# Patient Record
Sex: MALE | Race: WHITE | NOT HISPANIC OR LATINO | Employment: FULL TIME | URBAN - METROPOLITAN AREA
[De-identification: names, ages, dates, MRNs, and addresses within clinical notes are randomized per-mention and may not be internally consistent; named-entity substitution may affect disease eponyms.]

---

## 2017-06-05 ENCOUNTER — OFFICE VISIT (OUTPATIENT)
Dept: FAMILY MEDICINE | Facility: OTHER | Age: 27
End: 2017-06-05
Payer: MEDICAID

## 2017-06-05 VITALS
RESPIRATION RATE: 16 BRPM | HEIGHT: 65 IN | OXYGEN SATURATION: 98 % | HEART RATE: 102 BPM | DIASTOLIC BLOOD PRESSURE: 68 MMHG | WEIGHT: 130.9 LBS | SYSTOLIC BLOOD PRESSURE: 110 MMHG | BODY MASS INDEX: 21.81 KG/M2 | TEMPERATURE: 98.9 F

## 2017-06-05 DIAGNOSIS — J40 BRONCHITIS: Primary | ICD-10-CM

## 2017-06-05 DIAGNOSIS — R05.9 COUGH: ICD-10-CM

## 2017-06-05 PROCEDURE — 99214 OFFICE O/P EST MOD 30 MIN: CPT | Performed by: FAMILY MEDICINE

## 2017-06-05 RX ORDER — DOXYCYCLINE 100 MG/1
100 CAPSULE ORAL 2 TIMES DAILY
Qty: 20 CAPSULE | Refills: 0 | Status: SHIPPED | OUTPATIENT
Start: 2017-06-05 | End: 2017-06-15

## 2017-06-05 RX ORDER — CODEINE PHOSPHATE AND GUAIFENESIN 10; 100 MG/5ML; MG/5ML
1 SOLUTION ORAL EVERY 6 HOURS PRN
Qty: 120 ML | Refills: 0 | Status: SHIPPED | OUTPATIENT
Start: 2017-06-05 | End: 2018-06-21

## 2017-06-05 ASSESSMENT — PAIN SCALES - GENERAL: PAINLEVEL: NO PAIN (0)

## 2017-06-05 NOTE — MR AVS SNAPSHOT
After Visit Summary   6/5/2017    Sudhakar Abdalla    MRN: [id number]           Patient Information     Date Of Birth          1990        Visit Information        Provider Department      6/5/2017 4:20 PM Trinity Ballard MD [facility]        Today's Diagnoses     Bronchitis    -  1    Cough          Care Instructions      Bronchitis, Antibiotic Treatment (Adult)    Bronchitis is an infection of the air passages (bronchial tubes) in your lungs. It often occurs when you have a cold. This illness is contagious during the first few days and is spread through the air by coughing and sneezing, or by direct contact (touching the sick person and then touching your own eyes, nose, or mouth).  Symptoms of bronchitis include cough with mucus (phlegm) and low-grade fever. Bronchitis usually lasts 7 to 14 days. Mild cases can be treated with simple home remedies. More severe infection is treated with an antibiotic.  Home care  Follow these guidelines when caring for yourself at home:    If your symptoms are severe, rest at home for the first 2 to 3 days. When you go back to your usual activities, don't let yourself get too tired.    Do not smoke. Also avoid being exposed to secondhand smoke.    You may use over-the-counter medicines to control fever or pain, unless another medicine was prescribed. (Note: If you have chronic liver or kidney disease or have ever had a stomach ulcer or gastrointestinal bleeding, talk with your healthcare provider before using these medicines. Also talk to your provider if you are taking medicine to prevent blood clots.) Aspirin should never be given to anyone younger than 18 years of age who is ill with a viral infection or fever. It may cause severe liver or brain damage.    Your appetite may be poor, so a light diet is fine. Avoid dehydration by drinking 6 to 8 glasses of fluids per day (such as water, soft drinks, sports drinks, juices, tea, or soup).  Extra fluids will help loosen secretions in the nose and lungs.    Over-the-counter cough, cold, and sore-throat medicines will not shorten the length of the illness, but they may be helpful to reduce symptoms. (Note: Do not use decongestants if you have high blood pressure.)    Finish all antibiotic medicine. Do this even if you are feeling better after only a few days.  Follow-up care  Follow up with your healthcare provider, or as advised. If you had an X-ray or ECG (electrocardiogram), a specialist will review it. You will be notified of any new findings that may affect your care.  Note: If you are age 65 or older, or if you have a chronic lung disease or condition that affects your immune system, or you smoke, talk to your healthcare provider about having pneumococcal vaccinations and a yearly influenza vaccination (flu shot).  When to seek medical advice  Call your healthcare provider right away if any of these occur:    Fever of 100.4 F (38 C) or higher    Coughing up increased amounts of colored sputum    Weakness, drowsiness, headache, facial pain, ear pain, or a stiff neck   Call 911, or get immediate medical care  Contact emergency services right away if any of these occur.    Coughing up blood    Worsening weakness, drowsiness, headache, or stiff neck    Trouble breathing, wheezing, or pain with breathing    2713-1949 [employer]. [address] [place] [address], [place] [id number]. All rights reserved. This information is not intended as a substitute for professional medical care. Always follow your healthcare professional's instructions.                Follow-ups after your visit        Who to contact     If you have questions or need follow up information about today's clinic visit or your schedule please contact [facility] directly at [phone].  Normal or non-critical lab and imaging results will be communicated to you by MyChart, letter or phone within 4 business days after  "the clinic has received the results. If you do not hear from us within 7 days, please contact the clinic through [employer] or phone. If you have a critical or abnormal lab result, we will notify you by phone as soon as possible.  Submit refill requests through [employer] or call your pharmacy and they will forward the refill request to us. Please allow 3 business days for your refill to be completed.          Additional Information About Your Visit        [employer] Information     [employer] gives you secure access to your electronic health record. If you see a primary care provider, you can also send messages to your care team and make appointments. If you have questions, please call your primary care clinic.  If you do not have a primary care provider, please call [phone] and they will assist you.        Care EveryWhere ID     This is your Care EveryWhere ID. This could be used by other organizations to access your [facility] medical records  [id number]        Your Vitals Were     Pulse Temperature Respirations Height Pulse Oximetry BMI (Body Mass Index)    102 98.9  F (37.2  C) (Temporal) 16 5' 5\" (1.651 m) 98% 21.78 kg/m2       Blood Pressure from Last 3 Encounters:   06/05/17 110/68   12/28/16 110/66   06/01/16 106/64    Weight from Last 3 Encounters:   06/05/17 130 lb 14.4 oz (59.4 kg)   06/01/16 128 lb (58.1 kg)              Today, you had the following     No orders found for display         Today's Medication Changes          These changes are accurate as of: 6/5/17  4:34 PM.  If you have any questions, ask your nurse or doctor.               Start taking these medicines.        Dose/Directions    doxycycline Monohydrate 100 MG Caps   Used for:  Bronchitis   Started by:  Trinity Ballard MD        Dose:  100 mg   Take 1 capsule (100 mg) by mouth 2 times daily for 10 days   Quantity:  20 capsule   Refills:  0       guaiFENesin-codeine 100-10 MG/5ML Soln solution   Commonly known as:  ROBITUSSIN AC   Used " for:  Bronchitis, Cough   Started by:  Trinity Ballard MD        Dose:  1 tsp.   Take 5 mLs by mouth every 6 hours as needed for cough   Quantity:  120 mL   Refills:  0            Where to get your medicines      These medications were sent to [facility] - [place] - [address]   [address] [place] [address]     Phone:  [phone]     doxycycline Monohydrate 100 MG Caps         Some of these will need a paper prescription and others can be bought over the counter.  Ask your nurse if you have questions.     Bring a paper prescription for each of these medications     guaiFENesin-codeine 100-10 MG/5ML Soln solution                Primary Care Provider    None Specified       No primary provider on file.        Thank you!     Thank you for choosing [facility]  for your care. Our goal is always to provide you with excellent care. Hearing back from our patients is one way we can continue to improve our services. Please take a few minutes to complete the written survey that you may receive in the mail after your visit with us. Thank you!             Your Updated Medication List - Protect others around you: Learn how to safely use, store and throw away your medicines at www.disposemymeds.org.          This list is accurate as of: 6/5/17  4:34 PM.  Always use your most recent med list.                   Brand Name Dispense Instructions for use    doxycycline Monohydrate 100 MG Caps     20 capsule    Take 1 capsule (100 mg) by mouth 2 times daily for 10 days       EXCEDRIN PO      Take 500 mg by mouth 2 times daily as needed       guaiFENesin-codeine 100-10 MG/5ML Soln solution    ROBITUSSIN AC    120 mL    Take 5 mLs by mouth every 6 hours as needed for cough       NYQUIL PO          permethrin 5 % cream    ELIMITE    60 g    Apply cream from head to toe (execpt the face); leave on for 8-14 hours before washing off with water; may reapply in 1 week if live  mites appear.

## 2017-06-05 NOTE — PROGRESS NOTES
SUBJECTIVE:                                                    Sudhakar Abdalla is a 27 year old male who presents to clinic today for the following health issues:      Acute Illness   Acute illness concerns: cough  Onset: 5 days ago     Fever: no     Chills/Sweats: YES- only at the beginning     Headache (location?): no     Sinus Pressure:YES    Conjunctivitis:  no    Ear Pain: no    Rhinorrhea: no     Congestion: YES    Sore Throat: no     Cough: YES-productive of clear sputum    Wheeze: YES    Decreased Appetite: YES    Nausea: no    Vomiting: YES with coughing     Diarrhea:  YES    Dysuria/Freq.: no     Fatigue/Achiness: YES    Sick/Strep Exposure: no      Therapies Tried and outcome: patient has not tried anything at home.           Problem list and histories reviewed & adjusted, as indicated.  Additional history: as documented    Patient Active Problem List   Diagnosis     History of ADHD     Hx of migraines     Hx of scoliosis     Tobacco use disorder     History reviewed. No pertinent surgical history.    Social History   Substance Use Topics     Smoking status: Current Every Day Smoker     Packs/day: 1.00     Types: Cigarettes     Smokeless tobacco: Current User     Alcohol use 0.0 oz/week     0 Standard drinks or equivalent per week      Comment: rare     Family History   Problem Relation Age of Onset     DIABETES Mother      Coronary Artery Disease Mother      Aneurysm Father      Alzheimer Disease Paternal Grandfather          Current Outpatient Prescriptions   Medication Sig Dispense Refill     Pseudoeph-Doxylamine-DM-APAP (NYQUIL PO)        permethrin (ELIMITE) 5 % cream Apply cream from head to toe (execpt the face); leave on for 8-14 hours before washing off with water; may reapply in 1 week if live mites appear. (Patient not taking: Reported on 6/5/2017) 60 g 1     Aspirin-Acetaminophen-Caffeine (EXCEDRIN PO) Take 500 mg by mouth 2 times daily as needed       No Known Allergies  BP Readings from  "Last 3 Encounters:   06/05/17 110/68   12/28/16 110/66   06/01/16 106/64    Wt Readings from Last 3 Encounters:   06/05/17 130 lb 14.4 oz (59.4 kg)   06/01/16 128 lb (58.1 kg)                  Labs reviewed in EPIC    Reviewed and updated as needed this visit by clinical staff  Problems       Reviewed and updated as needed this visit by Provider  Problems         ROS:  C: NEGATIVE for fever, chills, change in weight  E/M: NEGATIVE for ear, mouth and throat problems  RESP:POSITIVE for cough-productive and wheezing  CV: NEGATIVE for chest pain, palpitations or peripheral edema    OBJECTIVE:                                                    /68  Pulse 102  Temp 98.9  F (37.2  C) (Temporal)  Resp 16  Ht 5' 5\" (1.651 m)  Wt 130 lb 14.4 oz (59.4 kg)  SpO2 98%  BMI 21.78 kg/m2  Body mass index is 21.78 kg/(m^2).   GENERAL: , alert, well nourished, well hydrated, no distress  HENT: ear canals- normal; TMs- normal; Nose- normal; Mouth- no ulcers, no lesions  NECK: no tenderness, no adenopathy, no asymmetry, no masses, no stiffness; thyroid- normal to palpation  RESP: Normal respiratory effort, bronchial breath sounds in lung fields, no wheezes  CV: regular rates and rhythm, normal S1 S2, no S3 or S4 and no murmur, no click or rub -  ABDOMEN: soft, no tenderness, no  hepatosplenomegaly, no masses, normal bowel sounds    Diagnostic test results:  Diagnostic Test Results:  none      ASSESSMENT/PLAN:                                                    (J40) Bronchitis  (primary encounter diagnosis)  Comment: Discussed with patient he doesn't want to try the Zithromax will prefer a different medication  Plan: doxycycline Monohydrate 100 MG CAPS,         guaiFENesin-codeine (ROBITUSSIN AC) 100-10         MG/5ML SOLN solution            (R05) Cough  Comment: Due to bronchitis  Plan: guaiFENesin-codeine (ROBITUSSIN AC) 100-10         MG/5ML SOLN solution          Follow up with Provider - FLOR Carey " MD Elio, MD  [facility]      Patient Instructions     Bronchitis, Antibiotic Treatment (Adult)    Bronchitis is an infection of the air passages (bronchial tubes) in your lungs. It often occurs when you have a cold. This illness is contagious during the first few days and is spread through the air by coughing and sneezing, or by direct contact (touching the sick person and then touching your own eyes, nose, or mouth).  Symptoms of bronchitis include cough with mucus (phlegm) and low-grade fever. Bronchitis usually lasts 7 to 14 days. Mild cases can be treated with simple home remedies. More severe infection is treated with an antibiotic.  Home care  Follow these guidelines when caring for yourself at home:    If your symptoms are severe, rest at home for the first 2 to 3 days. When you go back to your usual activities, don't let yourself get too tired.    Do not smoke. Also avoid being exposed to secondhand smoke.    You may use over-the-counter medicines to control fever or pain, unless another medicine was prescribed. (Note: If you have chronic liver or kidney disease or have ever had a stomach ulcer or gastrointestinal bleeding, talk with your healthcare provider before using these medicines. Also talk to your provider if you are taking medicine to prevent blood clots.) Aspirin should never be given to anyone younger than 18 years of age who is ill with a viral infection or fever. It may cause severe liver or brain damage.    Your appetite may be poor, so a light diet is fine. Avoid dehydration by drinking 6 to 8 glasses of fluids per day (such as water, soft drinks, sports drinks, juices, tea, or soup). Extra fluids will help loosen secretions in the nose and lungs.    Over-the-counter cough, cold, and sore-throat medicines will not shorten the length of the illness, but they may be helpful to reduce symptoms. (Note: Do not use decongestants if you have high blood pressure.)    Finish all  antibiotic medicine. Do this even if you are feeling better after only a few days.  Follow-up care  Follow up with your healthcare provider, or as advised. If you had an X-ray or ECG (electrocardiogram), a specialist will review it. You will be notified of any new findings that may affect your care.  Note: If you are age 65 or older, or if you have a chronic lung disease or condition that affects your immune system, or you smoke, talk to your healthcare provider about having pneumococcal vaccinations and a yearly influenza vaccination (flu shot).  When to seek medical advice  Call your healthcare provider right away if any of these occur:    Fever of 100.4 F (38 C) or higher    Coughing up increased amounts of colored sputum    Weakness, drowsiness, headache, facial pain, ear pain, or a stiff neck   Call 911, or get immediate medical care  Contact emergency services right away if any of these occur.    Coughing up blood    Worsening weakness, drowsiness, headache, or stiff neck    Trouble breathing, wheezing, or pain with breathing    3091-1828 [employer]. [address] [place] [address], [place] [address]. All rights reserved. This information is not intended as a substitute for professional medical care. Always follow your healthcare professional's instructions.

## 2017-06-05 NOTE — NURSING NOTE
"Chief Complaint   Patient presents with     Cough       Initial /68  Pulse 102  Temp 98.9  F (37.2  C) (Temporal)  Resp 16  Ht 5' 5\" (1.651 m)  Wt 130 lb 14.4 oz (59.4 kg)  SpO2 98%  BMI 21.78 kg/m2 Estimated body mass index is 21.78 kg/(m^2) as calculated from the following:    Height as of this encounter: 5' 5\" (1.651 m).    Weight as of this encounter: 130 lb 14.4 oz (59.4 kg).  Medication Reconciliation: complete     Nadine Romeo MA    "

## 2017-06-05 NOTE — PATIENT INSTRUCTIONS
Bronchitis, Antibiotic Treatment (Adult)    Bronchitis is an infection of the air passages (bronchial tubes) in your lungs. It often occurs when you have a cold. This illness is contagious during the first few days and is spread through the air by coughing and sneezing, or by direct contact (touching the sick person and then touching your own eyes, nose, or mouth).  Symptoms of bronchitis include cough with mucus (phlegm) and low-grade fever. Bronchitis usually lasts 7 to 14 days. Mild cases can be treated with simple home remedies. More severe infection is treated with an antibiotic.  Home care  Follow these guidelines when caring for yourself at home:    If your symptoms are severe, rest at home for the first 2 to 3 days. When you go back to your usual activities, don't let yourself get too tired.    Do not smoke. Also avoid being exposed to secondhand smoke.    You may use over-the-counter medicines to control fever or pain, unless another medicine was prescribed. (Note: If you have chronic liver or kidney disease or have ever had a stomach ulcer or gastrointestinal bleeding, talk with your healthcare provider before using these medicines. Also talk to your provider if you are taking medicine to prevent blood clots.) Aspirin should never be given to anyone younger than 18 years of age who is ill with a viral infection or fever. It may cause severe liver or brain damage.    Your appetite may be poor, so a light diet is fine. Avoid dehydration by drinking 6 to 8 glasses of fluids per day (such as water, soft drinks, sports drinks, juices, tea, or soup). Extra fluids will help loosen secretions in the nose and lungs.    Over-the-counter cough, cold, and sore-throat medicines will not shorten the length of the illness, but they may be helpful to reduce symptoms. (Note: Do not use decongestants if you have high blood pressure.)    Finish all antibiotic medicine. Do this even if you are feeling better after only a  few days.  Follow-up care  Follow up with your healthcare provider, or as advised. If you had an X-ray or ECG (electrocardiogram), a specialist will review it. You will be notified of any new findings that may affect your care.  Note: If you are age 65 or older, or if you have a chronic lung disease or condition that affects your immune system, or you smoke, talk to your healthcare provider about having pneumococcal vaccinations and a yearly influenza vaccination (flu shot).  When to seek medical advice  Call your healthcare provider right away if any of these occur:    Fever of 100.4 F (38 C) or higher    Coughing up increased amounts of colored sputum    Weakness, drowsiness, headache, facial pain, ear pain, or a stiff neck   Call 911, or get immediate medical care  Contact emergency services right away if any of these occur.    Coughing up blood    Worsening weakness, drowsiness, headache, or stiff neck    Trouble breathing, wheezing, or pain with breathing    6216-7482 [employer]. [address] [place] [address], [place] [address]. All rights reserved. This information is not intended as a substitute for professional medical care. Always follow your healthcare professional's instructions.

## 2018-06-18 NOTE — PROGRESS NOTES
"  SUBJECTIVE:   Sudhakar Abdalla is a 28 year old male who presents to clinic today for the following health issues:    HPI  Abnormal Mood Symptoms  Onset: years ago    Description:   Depression: no   Anxiety: YES    Accompanying Signs & Symptoms:  Still participating in activities that you used to enjoy: yes laly  Fatigue: YES  Irritability: YES  Difficulty concentrating: YES- depends on how much sleep  Changes in appetite: YES  Problems with sleep: YES  Heart racing/beating fast : YES  Thoughts of hurting yourself or others: none    History:   Recent stress: YES- make tacos for a living, promoted to shift lead at work   Prior depression hospitalization: None  Family history of depression: YES- mom, dad had anger issues  Family history of anxiety: YES- possibly    Precipitating factors:   Alcohol/drug use: YES- cannabis    Alleviating factors:  Smoking cannabis    Therapies Tried and outcome: none  Problem list and histories reviewed & adjusted, as indicated.    Will have issues where he's having conversations with anyone and they say the wrong the thing, and he'll feel suddenly very angry. Having screaming matches with people.   His dad was the same way and he is worried he will \"end up like him\". Note: father  of a brain aneurysm at age 38 and patient gets migraines, so he's worried about that too.    Feels like he cannot control his anger. Has good days and bad days, where he'll wake up angry.   Has noticed if he runs out of the marijuana, might be more easily angered (wondered if it's because he knows he doesn't have it).   Does not drink alcohol to make himself feel better.     CARIDAD-7 SCORE 2018   Total Score 3 (minimal anxiety)   Total Score 3       PHQ-9 SCORE 2018   Total Score MyChart 9 (Mild depression)   Total Score 9     Says as a kid, was diagnosed with ADD, ADHD, ODD, possible Asperger's, and maybe more, per him.    Additional history: as documented      Patient Active Problem List "   Diagnosis     History of ADHD     Hx of migraines     Hx of scoliosis     Tobacco use disorder     Chronic bilateral low back pain with bilateral sciatica     Anger     Premature ejaculation     History reviewed. No pertinent surgical history.    Social History   Substance Use Topics     Smoking status: Current Every Day Smoker     Packs/day: 1.50     Types: Cigarettes     Smokeless tobacco: Current User     Alcohol use 0.0 oz/week     0 Standard drinks or equivalent per week      Comment: rare     Family History   Problem Relation Age of Onset     Diabetes Mother      Coronary Artery Disease Mother      Aneurysm Father      Alzheimer Disease Paternal Grandfather          Current Outpatient Prescriptions   Medication Sig Dispense Refill     Aspirin-Acetaminophen-Caffeine (EXCEDRIN PO) Take 500 mg by mouth 2 times daily as needed       nicotine (NICOTROL) 10 MG Inhaler Inhale 6-16 Cartridges into the lungs daily as needed for smoking cessation 180 each 1     Pseudoeph-Doxylamine-DM-APAP (NYQUIL PO)        sertraline (ZOLOFT) 50 MG tablet Take 1 tablet (50 mg) by mouth daily 30 tablet 1     BP Readings from Last 3 Encounters:   06/21/18 110/68   06/05/17 110/68   12/28/16 110/66    Wt Readings from Last 3 Encounters:   06/21/18 120 lb 9.6 oz (54.7 kg)   06/05/17 130 lb 14.4 oz (59.4 kg)   06/01/16 128 lb (58.1 kg)           ROS:  Constitutional, HEENT, cardiovascular, pulmonary, gi and gu systems are negative, except as otherwise noted.    Shortness of breath and some chronic cough with the smoking. Also some wheezing. Would be interested in albuterol nebulizer to help open up his lungs. Says in the past inhaler made breathing worse.     Premature ejaculation issues. Used to last 45 minutes, now ejaculating before penetration sometimes. Ejaculating quickly every time.     Decreased appetite. Has gone 3 days without eating. Then when he eats, will eat a lot of food all at once and he is losing weight.      Wondering  "if he can have a referral to chiropractor that would take his insurance, for his scoliosis. Would also consider referral to physical therapy, hasn't done physical therapy in many years. Had surgery at age of 8 yo. Pain is mostly low back. He cracks his back which helps. Feels like the pain is mostly muscle tension around it. Pain does not radiate. Says he has a little leg weakness. Has tried an inversion table for the back and it felt really good but only lasted short while. Pain is pretty constant. Cannibis makes it better.   He also has episodes of side or mid-abdominal pain when active that are severe and last a few minutes.    Wants to quit smoking. Has been smoking 1.5 + ppd, which is an increase for him. He finds this calms him down. Does not want Chantix. Tried patches once and had a skin reaction (when he was 18).       OBJECTIVE:     /68  Pulse 64  Temp 98.6  F (37  C)  Resp 16  Ht 5' 4.49\" (1.638 m)  Wt 120 lb 9.6 oz (54.7 kg)  BMI 20.39 kg/m2  Body mass index is 20.39 kg/(m^2).  GENERAL: healthy, alert and no distress  NECK: no adenopathy, no asymmetry, masses, or scars and thyroid normal to palpation  RESP: lungs clear to auscultation - no rales, rhonchi or wheezes  CV: regular rate and rhythm, normal S1 S2, no S3 or S4, no murmur, click or rub, no peripheral edema and peripheral pulses strong  ABDOMEN: soft, nontender, no hepatosplenomegaly, no masses and bowel sounds normal  MS: no gross musculoskeletal defects noted, no edema    Diagnostic Test Results:  Results for orders placed or performed in visit on 06/01/16   Lipid Profile with reflex to direct LDL   Result Value Ref Range    Cholesterol 174 <200 mg/dL    Triglycerides 79 <150 mg/dL    HDL Cholesterol 36 (L) >39 mg/dL    LDL Cholesterol Calculated 122 (H) <100 mg/dL    Non HDL Cholesterol 138 (H) <130 mg/dL   Glucose   Result Value Ref Range    Glucose 84 70 - 99 mg/dL   TSH with free T4 reflex   Result Value Ref Range    TSH 1.64 " 0.40 - 4.00 mU/L   Hemoglobin   Result Value Ref Range    Hemoglobin 14.8 13.3 - 17.7 g/dL   WBC Differential   Result Value Ref Range    Diff Method Automated Method     % Neutrophils 39.3 %    % Lymphocytes 50.8 %    % Monocytes 7.8 %    % Eosinophils 1.8 %    % Basophils 0.3 %    Absolute Neutrophil 3.2 1.6 - 8.3 10e9/L    Absolute Lymphocytes 4.1 0.8 - 5.3 10e9/L    Absolute Monocytes 0.6 0.0 - 1.3 10e9/L    Absolute Eosinophils 0.1 0.0 - 0.7 10e9/L    Absolute Basophils 0.0 0.0 - 0.2 10e9/L       ASSESSMENT/PLAN:     Tobacco Cessation:   reports that he has been smoking Cigarettes.  He has been smoking about 1.50 packs per day. He uses smokeless tobacco.  Tobacco Cessation Action Plan: Pharmacotherapies : other Nicotine replacement  Self help information given to patient        ICD-10-CM    1. Anger R45.4 sertraline (ZOLOFT) 50 MG tablet   2. Shortness of breath R06.02    3. Tobacco use disorder F17.200 TOBACCO CESSATION - FOR HEALTH MAINTENANCE     nicotine (NICOTROL) 10 MG Inhaler   4. Chronic bilateral low back pain with bilateral sciatica M54.42 [facility], AND CHIROPRACTIC REFERRAL    M54.41     G89.29    5. Hx of scoliosis Z87.39 [facility], HAND, AND CHIROPRACTIC REFERRAL   6. Premature ejaculation F52.4 sertraline (ZOLOFT) 50 MG tablet   7. Weight loss R63.4 CBC with platelets     Comprehensive metabolic panel     TSH with free T4 reflex   8. Decreased appetite R63.0 CBC with platelets     Comprehensive metabolic panel     TSH with free T4 reflex   9. Screening for HIV without presence of risk factors Z11.4 HIV Antigen Antibody Combo     1. Patient reports long history of anger issues. He currently responds to his anger outbursts with tobacco and marijuana. Will start patient on sertraline 50 mg and follow up in 1 month.   2, 3. Patient has increased his tobacco use with recent increase in back pain and mood concerns. He states he has more shortness of breath, especially with exertion, and would like to  quit. Order sent for nicotine inhaler and information for Quit Plan given. Lungs sounded clear bilaterally, so no need for albuterol at the moment since he's planning on reducing his smoking/quitting. If breathing issues continue, consider spirometry.   4, 5. History of scoliosis s/p surgery. Now reporting worsening low back pain, bilateral, sometimes with pain radiating into legs. Strength normal on exam. Will send referral for chiropractor. Could consider physical therapy as well.   6. Patient reporting new onset premature ejaculation, sometimes before penetration. Starting sertraline for mood, so hopeful this will help delay ejaculation as well. follow up in 1 month.   7, 8. He reports decreased appetite, sometimes going up to 3 days without desire to eat. When he does eat, eats large quantities. He has lost weight. Will check CBC, TSH, CMP. Hopeful controlling mood and/or pain will help appetite as well.   9. Patient agreeable to HIV screening per CDC guidelines.             Patient Instructions   Thank you for visiting [facility]    Let's try sertraline to help with your mood/anger.      Let me know if issues.    OK to try nicotine replacement to help you quit smoking and use of marijuana.    Let us know if you don't hear from chiropractic soon.    Contact us or return if questions or concerns.     We'll let you know your lab results as soon as we can.     Please Follow Up when indicated on the section below this one on your After-Visit Summary.      If you had imaging scheduled please refer to your radiology prep sheet.    Appointment    Date_______________     Time_____________    Day:   M TU W TH F    With____________________________    Location_________________________    If you need medication refills, please contact your pharmacy 3 days before your prescriptions runs out. If you are out of refills, your pharmacy will contact contact the clinic.    Contact us or return if questions  or concerns.     -Your Care Team:  MD Kaitlin Hsu PA-C Joel De Haan, PA-C Elizabeth McLean, APRN CNP    General information about your clinic      Clinic hours:     Lab hours:  Phone [phone]  Monday 7:30 am-7 pm    Monday 8:30 am-6:30 pm  Tuesday-Friday 7:30 am-5 pm   Tuesday-Friday 8:30 am-4:30 pm    Pharmacy hours:  Phone [phone]  Monday 8:30 am-7pm  Tuesday-Friday 8:30am-6 pm                                       Mychart assistance [phone]        We would like to hear from you, how was your visit today?    Madisyn Powers  Patient Information Supervisor   Patient Care Supervisor  [facility]  [phone]       This document serves as a record of the services and decisions personally performed and made by Delbert Wade MD.  It was created on his/her behalf by Anai Alvarenga, a trained medical student and scribe.  The creation of this record is based on the provider's personal observations and the statements of the patient.     Anai Alvarenga, MS4  June 21, 2018    This patient was seen and examined by myself as well as the medical student.  The medical student has scribed the note and I have reviewed it, edited it appropriately and agree with the final documentation. Electronically signed by MD Delbert Hsu MD, MD  [facility]

## 2018-06-21 ENCOUNTER — OFFICE VISIT (OUTPATIENT)
Dept: FAMILY MEDICINE | Facility: OTHER | Age: 28
End: 2018-06-21
Payer: MEDICAID

## 2018-06-21 VITALS
BODY MASS INDEX: 20.59 KG/M2 | RESPIRATION RATE: 16 BRPM | WEIGHT: 120.6 LBS | SYSTOLIC BLOOD PRESSURE: 110 MMHG | TEMPERATURE: 98.6 F | HEART RATE: 64 BPM | DIASTOLIC BLOOD PRESSURE: 68 MMHG | HEIGHT: 64 IN

## 2018-06-21 DIAGNOSIS — R63.0 DECREASED APPETITE: ICD-10-CM

## 2018-06-21 DIAGNOSIS — R63.4 WEIGHT LOSS: ICD-10-CM

## 2018-06-21 DIAGNOSIS — F17.200 TOBACCO USE DISORDER: ICD-10-CM

## 2018-06-21 DIAGNOSIS — Z87.39 HX OF SCOLIOSIS: ICD-10-CM

## 2018-06-21 DIAGNOSIS — R45.4 ANGER: Primary | ICD-10-CM

## 2018-06-21 DIAGNOSIS — M54.42 CHRONIC BILATERAL LOW BACK PAIN WITH BILATERAL SCIATICA: ICD-10-CM

## 2018-06-21 DIAGNOSIS — R06.02 SHORTNESS OF BREATH: ICD-10-CM

## 2018-06-21 DIAGNOSIS — F52.4 PREMATURE EJACULATION: ICD-10-CM

## 2018-06-21 DIAGNOSIS — Z11.4 SCREENING FOR HIV WITHOUT PRESENCE OF RISK FACTORS: ICD-10-CM

## 2018-06-21 DIAGNOSIS — M54.41 CHRONIC BILATERAL LOW BACK PAIN WITH BILATERAL SCIATICA: ICD-10-CM

## 2018-06-21 DIAGNOSIS — G89.29 CHRONIC BILATERAL LOW BACK PAIN WITH BILATERAL SCIATICA: ICD-10-CM

## 2018-06-21 LAB
ALBUMIN SERPL-MCNC: 4.5 G/DL (ref 3.4–5)
ALP SERPL-CCNC: 88 U/L (ref 40–150)
ALT SERPL W P-5'-P-CCNC: 23 U/L (ref 0–70)
ANION GAP SERPL CALCULATED.3IONS-SCNC: 9 MMOL/L (ref 3–14)
AST SERPL W P-5'-P-CCNC: 12 U/L (ref 0–45)
BILIRUB SERPL-MCNC: 0.6 MG/DL (ref 0.2–1.3)
BUN SERPL-MCNC: 11 MG/DL (ref 7–30)
CALCIUM SERPL-MCNC: 9.2 MG/DL (ref 8.5–10.1)
CHLORIDE SERPL-SCNC: 104 MMOL/L (ref 94–109)
CO2 SERPL-SCNC: 27 MMOL/L (ref 20–32)
CREAT SERPL-MCNC: 0.86 MG/DL (ref 0.66–1.25)
ERYTHROCYTE [DISTWIDTH] IN BLOOD BY AUTOMATED COUNT: 13.8 % (ref 10–15)
GFR SERPL CREATININE-BSD FRML MDRD: >90 ML/MIN/1.7M2
GLUCOSE SERPL-MCNC: 93 MG/DL (ref 70–99)
HCT VFR BLD AUTO: 47.6 % (ref 40–53)
HGB BLD-MCNC: 16.2 G/DL (ref 13.3–17.7)
MCH RBC QN AUTO: 30.5 PG (ref 26.5–33)
MCHC RBC AUTO-ENTMCNC: 34 G/DL (ref 31.5–36.5)
MCV RBC AUTO: 90 FL (ref 78–100)
PLATELET # BLD AUTO: 212 10E9/L (ref 150–450)
POTASSIUM SERPL-SCNC: 5 MMOL/L (ref 3.4–5.3)
PROT SERPL-MCNC: 7.9 G/DL (ref 6.8–8.8)
RBC # BLD AUTO: 5.32 10E12/L (ref 4.4–5.9)
SODIUM SERPL-SCNC: 140 MMOL/L (ref 133–144)
TSH SERPL DL<=0.005 MIU/L-ACNC: 0.94 MU/L (ref 0.4–4)
WBC # BLD AUTO: 7.7 10E9/L (ref 4–11)

## 2018-06-21 PROCEDURE — 36415 COLL VENOUS BLD VENIPUNCTURE: CPT | Performed by: FAMILY MEDICINE

## 2018-06-21 PROCEDURE — 80053 COMPREHEN METABOLIC PANEL: CPT | Performed by: FAMILY MEDICINE

## 2018-06-21 PROCEDURE — 99214 OFFICE O/P EST MOD 30 MIN: CPT | Performed by: FAMILY MEDICINE

## 2018-06-21 PROCEDURE — 87389 HIV-1 AG W/HIV-1&-2 AB AG IA: CPT | Performed by: FAMILY MEDICINE

## 2018-06-21 PROCEDURE — 85027 COMPLETE CBC AUTOMATED: CPT | Performed by: FAMILY MEDICINE

## 2018-06-21 PROCEDURE — 84443 ASSAY THYROID STIM HORMONE: CPT | Performed by: FAMILY MEDICINE

## 2018-06-21 ASSESSMENT — PATIENT HEALTH QUESTIONNAIRE - PHQ9
SUM OF ALL RESPONSES TO PHQ QUESTIONS 1-9: 9
10. IF YOU CHECKED OFF ANY PROBLEMS, HOW DIFFICULT HAVE THESE PROBLEMS MADE IT FOR YOU TO DO YOUR WORK, TAKE CARE OF THINGS AT HOME, OR GET ALONG WITH OTHER PEOPLE: SOMEWHAT DIFFICULT
SUM OF ALL RESPONSES TO PHQ QUESTIONS 1-9: 9

## 2018-06-21 ASSESSMENT — ANXIETY QUESTIONNAIRES
1. FEELING NERVOUS, ANXIOUS, OR ON EDGE: SEVERAL DAYS
7. FEELING AFRAID AS IF SOMETHING AWFUL MIGHT HAPPEN: NOT AT ALL
5. BEING SO RESTLESS THAT IT IS HARD TO SIT STILL: NOT AT ALL
7. FEELING AFRAID AS IF SOMETHING AWFUL MIGHT HAPPEN: NOT AT ALL
2. NOT BEING ABLE TO STOP OR CONTROL WORRYING: NOT AT ALL
4. TROUBLE RELAXING: SEVERAL DAYS
GAD7 TOTAL SCORE: 3
GAD7 TOTAL SCORE: 3
3. WORRYING TOO MUCH ABOUT DIFFERENT THINGS: NOT AT ALL
6. BECOMING EASILY ANNOYED OR IRRITABLE: SEVERAL DAYS
GAD7 TOTAL SCORE: 3

## 2018-06-21 ASSESSMENT — PAIN SCALES - GENERAL: PAINLEVEL: MILD PAIN (3)

## 2018-06-21 NOTE — MR AVS SNAPSHOT
After Visit Summary   6/21/2018    Sudhakar Abdalla    MRN: [id number]           Patient Information     Date Of Birth          1990        Visit Information        Provider Department      6/21/2018 1:45 PM Delbert Wade MD [facility]        Today's Diagnoses     Tobacco use disorder    -  1    Anger        Premature ejaculation        Shortness of breath        Hx of scoliosis        Chronic bilateral low back pain with bilateral sciatica        Screening for HIV without presence of risk factors        Weight loss        Decreased appetite          Care Instructions    Thank you for visiting [facility]    Let's try sertraline to help with your mood/anger.      Let me know if issues.    OK to try nicotine replacement to help you quit smoking and use of marijuana.    Let us know if you don't hear from chiropractic soon.    Contact us or return if questions or concerns.     We'll let you know your lab results as soon as we can.     Please Follow Up when indicated on the section below this one on your After-Visit Summary.      If you had imaging scheduled please refer to your radiology prep sheet.    Appointment    Date_______________     Time_____________    Day:   M TU W TH F    With____________________________    Location_________________________    If you need medication refills, please contact your pharmacy 3 days before your prescriptions runs out. If you are out of refills, your pharmacy will contact contact the clinic.    Contact us or return if questions or concerns.     -Your Care Team:  MD Kaitlin Hsu PA-C Joel De Haan, PA-C Elizabeth McLean, ROSA SNOW    General information about your clinic      Clinic hours:     Lab hours:  Phone [phone]  Monday 7:30 am-7 pm    Monday 8:30 am-6:30 pm  Tuesday-Friday 7:30 am-5 pm   Tuesday-Friday 8:30 am-4:30 pm    Pharmacy hours:  Phone [phone]  Monday 8:30  am-7pm  Tuesday-Friday 8:30am-6 pm                                       Katelynt assistance [phone]        We would like to hear from you, how was your visit today?    Madisyn Powers  Patient Information Supervisor   Patient Care Supervisor  [facility] [place], and [facility] [place], and [facility]  [phone]             Follow-ups after your visit        Additional Services     [facility], HAND, AND CHIROPRACTIC REFERRAL       **This order will print in the [facility] Scheduling Office**    Physical Therapy, Hand Therapy and Chiropractic Care are available through:    *[facility]  *[facility]  *[facility]    Call one number to schedule at any of the above locations: [phone].    Your provider has referred you to: Chiropractic at [facility] or [facility]    Indication/Reason for Referral: Low Back Pain  Onset of Illness: childhood  Therapy Orders: Evaluate and Treat  Special Programs: if indicated  Special Request: None    Tanvi Tinajero      Additional Comments for the Therapist or Chiropractor: Pt with history of scoliosis and surgery for it.    Please be aware that coverage of these services is subject to the terms and limitations of your health insurance plan.  Call member services at your health plan with any benefit or coverage questions.      Please bring the following to your appointment:    *Your personal calendar for scheduling future appointments  *Comfortable clothing                  Follow-up notes from your care team     Return in about 4 weeks (around 7/19/2018) for Routine Visit, Evisit or phone visit.      Who to contact     If you have questions or need follow up information about today's clinic visit or your schedule please contact [facility] directly at [phone].  Normal or non-critical lab and imaging results will be communicated to you by MyChart, letter or phone  "within 4 business days after the clinic has received the results. If you do not hear from us within 7 days, please contact the clinic through [email or web address] or phone. If you have a critical or abnormal lab result, we will notify you by phone as soon as possible.  Submit refill requests through [email or web address] or call your pharmacy and they will forward the refill request to us. Please allow 3 business days for your refill to be completed.          Additional Information About Your Visit        [employer] Information     [email or web address] gives you secure access to your electronic health record. If you see a primary care provider, you can also send messages to your care team and make appointments. If you have questions, please call your primary care clinic.  If you do not have a primary care provider, please call [phone] and they will assist you.        Care EveryWhere ID     This is your Care EveryWhere ID. This could be used by other organizations to access your [place] medical records  [id number]        Your Vitals Were     Pulse Temperature Respirations Height BMI (Body Mass Index)       64 98.6  F (37  C) 16 5' 4.49\" (1.638 m) 20.39 kg/m2        Blood Pressure from Last 3 Encounters:   06/21/18 110/68   06/05/17 110/68   12/28/16 110/66    Weight from Last 3 Encounters:   06/21/18 120 lb 9.6 oz (54.7 kg)   06/05/17 130 lb 14.4 oz (59.4 kg)   06/01/16 128 lb (58.1 kg)              We Performed the Following     CBC with platelets     Comprehensive metabolic panel     HIV Antigen Antibody Combo     [facility] PT, HAND, AND CHIROPRACTIC REFERRAL     TOBACCO CESSATION - FOR HEALTH MAINTENANCE     TSH with free T4 reflex          Today's Medication Changes          These changes are accurate as of 6/21/18  3:04 PM.  If you have any questions, ask your nurse or doctor.               Start taking these medicines.        Dose/Directions    nicotine 10 MG Inhaler   Commonly known as:  NICOTROL   Used for:  Tobacco use disorder   Started by:  " Delbert Wade MD        Dose:  6-16 Cartridge   Inhale 6-16 Cartridges into the lungs daily as needed for smoking cessation   Quantity:  180 each   Refills:  1       sertraline 50 MG tablet   Commonly known as:  ZOLOFT   Used for:  Anger, Premature ejaculation   Started by:  Delbert Wade MD        Dose:  50 mg   Take 1 tablet (50 mg) by mouth daily   Quantity:  30 tablet   Refills:  1            Where to get your medicines      These medications were sent to [facility] [place] - [address]   [address] [place] [address]     Phone:  [phone]     nicotine 10 MG Inhaler    sertraline 50 MG tablet                Primary Care Provider Office Phone # Fax #    Delbert Wade [phone] [address] [place] [address]        Equal Access to Services     [facility]: Hadii aad ku hadasho Soomaali, waaxda luqadaha, qaybta kaalmada adeegyada, waxay yuliin hayaan adeelicia merida . So [facility] [phone].    ATENCIÓN: Si habla español, tiene a zamora disposición servicios gratuitos de asistencia lingüística. [facility] [phone].    We comply with applicable federal civil rights laws and [place] laws. We do not discriminate on the basis of race, color, national origin, age, disability, sex, sexual orientation, or gender identity.            Thank you!     Thank you for choosing [facility]  for your care. Our goal is always to provide you with excellent care. Hearing back from our patients is one way we can continue to improve our services. Please take a few minutes to complete the written survey that you may receive in the mail after your visit with us. Thank you!             Your Updated Medication List - Protect others around you: Learn how to safely use, store and throw away your medicines at www.disposemymeds.org.          This list is accurate as of 6/21/18  3:04 PM.  Always use your most recent med  list.                   Brand Name Dispense Instructions for use Diagnosis    EXCEDRIN PO      Take 500 mg by mouth 2 times daily as needed        nicotine 10 MG Inhaler    NICOTROL    180 each    Inhale 6-16 Cartridges into the lungs daily as needed for smoking cessation    Tobacco use disorder       NYQUIL PO           sertraline 50 MG tablet    ZOLOFT    30 tablet    Take 1 tablet (50 mg) by mouth daily    Anger, Premature ejaculation

## 2018-06-21 NOTE — PATIENT INSTRUCTIONS
Thank you for visiting [facility]    Let's try sertraline to help with your mood/anger.      Let me know if issues.    OK to try nicotine replacement to help you quit smoking and use of marijuana.    Let us know if you don't hear from chiropractic soon.    Contact us or return if questions or concerns.     We'll let you know your lab results as soon as we can.     Please Follow Up when indicated on the section below this one on your After-Visit Summary.      If you had imaging scheduled please refer to your radiology prep sheet.    Appointment    Date_______________     Time_____________    Day:   M TU W TH F    With____________________________    Location_________________________    If you need medication refills, please contact your pharmacy 3 days before your prescriptions runs out. If you are out of refills, your pharmacy will contact contact the clinic.    Contact us or return if questions or concerns.     -Your Care Team:  MD Kaitlin Hsu PA-C Joel De Haan, PA-C Elizabeth McLean, APRN CNP    General information about your clinic      Clinic hours:     Lab hours:  Phone [phone]  Monday 7:30 am-7 pm    Monday 8:30 am-6:30 pm  Tuesday-Friday 7:30 am-5 pm   Tuesday-Friday 8:30 am-4:30 pm    Pharmacy hours:  Phone [phone]  Monday 8:30 am-7pm  Tuesday-Friday 8:30am-6 pm                                       Mychart assistance [phone]        We would like to hear from you, how was your visit today?    Madisyn Powers  Patient Information Supervisor   Patient Care Supervisor  [facility] [place], and [facility], and [facility]  [phone]

## 2018-06-22 LAB — HIV 1+2 AB+HIV1 P24 AG SERPL QL IA: NONREACTIVE

## 2018-06-22 ASSESSMENT — PATIENT HEALTH QUESTIONNAIRE - PHQ9: SUM OF ALL RESPONSES TO PHQ QUESTIONS 1-9: 9

## 2018-06-22 ASSESSMENT — ANXIETY QUESTIONNAIRES: GAD7 TOTAL SCORE: 3

## 2018-08-11 ENCOUNTER — HOSPITAL ENCOUNTER (EMERGENCY)
Facility: CLINIC | Age: 28
Discharge: HOME | End: 2018-08-12
Attending: FAMILY MEDICINE | Admitting: FAMILY MEDICINE
Payer: MEDICAID

## 2018-08-11 DIAGNOSIS — T23.211A PARTIAL THICKNESS BURN OF RIGHT THUMB, INITIAL ENCOUNTER: ICD-10-CM

## 2018-08-11 PROCEDURE — 99283 EMERGENCY DEPT VISIT LOW MDM: CPT | Performed by: FAMILY MEDICINE

## 2018-08-11 PROCEDURE — 99283 EMERGENCY DEPT VISIT LOW MDM: CPT | Mod: Z6 | Performed by: FAMILY MEDICINE

## 2018-08-11 NOTE — ED AVS SNAPSHOT
[facility] Emergency Department    [address] [place] [address]    Phone:  [phone]    Fax:  [phone]                                       Sudhakar Abdalla   MRN: [id number]    Department:  [facility] Emergency Department   Date of Visit:  8/11/2018           After Visit Summary Signature Page     I have received my discharge instructions, and my questions have been answered. I have discussed any challenges I see with this plan with the nurse or doctor.    ..........................................................................................................................................  Patient/Patient Representative Signature      ..........................................................................................................................................  Patient Representative Print Name and Relationship to Patient    ..................................................               ................................................  Date                                            Time    ..........................................................................................................................................  Reviewed by Signature/Title    ...................................................              ..............................................  Date                                                            Time

## 2018-08-11 NOTE — ED AVS SNAPSHOT
[facility] Emergency Department    [address] [place] [address]    Phone:  [phone]    Fax:  [phone]                                       Sudhakar Abdalla   MRN: [id number]    Department:  [facility] Emergency Department   Date of Visit:  8/11/2018           Patient Information     Date Of Birth          1990        Your diagnoses for this visit were:     Partial thickness burn of right thumb, initial encounter        You were seen by Ernie Echols MD.      Follow-up Information     Follow up with Delbert Wade MD In 1 week.    Specialty:  Family Practice    Contact information:    [address] [place] [id number] [phone]          Discharge Instructions         Keep the wound covered.  Bacitracin to be applied with dressing changes twice a day.  Watch for signs of infection.  Tylenol/ibuprofen as needed for discomfort.  Recheck in clinic in 1 week for reevaluation and eventual MMI determination.  Take the copy of your Report of Workability Form to your employer.  Your copy is below.  Thank you for choosing [facility]. We appreciate the opportunity to meet your urgent medical needs. Please let us know if we could have done anything to make your stay more satisfying.    After discharge, please closely monitor for any new or worsening symptoms. Return to the Emergency Department if you develop any acute worsening signs or symptoms.    If you had lab work, cultures or imaging studies done during your stay, the final results may still be pending. We will call you if your plan of care needs to change. However, if you are not improving as expected, please follow up with your primary care provider or clinic.     Start any prescription medications that were prescribed to you and take them as directed.     Please see additional handouts that may be pertinent to your condition.  Second-Degree Burn  A burn occurs when skin is exposed to  too much heat, sun, or harsh chemicals. A second-degree burn (partial-thickness burn) is deeper than a first-degree burn (superficial burn). It usually causes a blister to form. The blister may remain intact and gradually go away on its own. Or it may break open. The goal of treatment is to relieve pain and stop infection while the burn heals.  Home care  Use pain medicine as directed. If no pain medicine was prescribed, you may use over-the-counter medicine to control pain. If you have chronic liver or kidney disease, talk with your healthcare provider before using acetaminophen or ibuprofen. Also talk with your provider if you've had a stomach ulcer or GI bleeding.  General care    On the first day, you may put a cool compress on the wound to ease pain. A cool compress is a small towel soaked in cool water.    If you were sent home with the blister intact, don't break the blister. The risk for infection is greater if the blister breaks. If a bandage was applied, change it once a day, unless told otherwise. If the bandage becomes wet or soiled, change it as soon as you can.    Sometimes an infection may occur even with proper treatment. Check the burn daily for the signs of infection listed below.    Eat more calories and protein until your wound is healed.    Wear a hat, sunscreen, and long sleeves while in the sun to protect the skin.    Don't pick or scratch at the wound. Use over-the-counter medicines like diphenhydramine for itching.    Avoid tight-fitting clothes.  To change a bandage:    Wash your hands.    Take off the old bandage. If the bandage sticks, soak it off under warm running water.    Once the bandage is off, gently wash the burn area with mild soap and warm water to remove any cream, ointment, ooze, or scab. You may do this in a sink, under a tub faucet, or in the shower. Rinse off the soap and gently pat dry with a clean towel.    Check for signs of infection listed below.    Put any prescribed  antibiotic cream or ointment on the wound.    Cover the burn with nonstick gauze. Then wrap it with the bandage material.  Follow-up care  Follow up with your healthcare provider, or as advised.  When to seek medical advice  Call your healthcare provider right away if you have any of these signs of infection:    Fever of 100.4 F (38 C) or higher, or as directed by your healthcare provider    Pain that gets worse    Redness or swelling that gets worse    Pus comes from the burn    Red streaks in your skin coming from the burn    Wound doesn't appear to be healing    Nausea or vomiting   Date Last Reviewed: 2017-2017 [employer]. [address] [place] [address]. All rights reserved. This information is not intended as a substitute for professional medical care. Always follow your healthcare professional's instructions.      REPORT OF WORK ABILITY    PATIENT DATA  Employee Name:   SUDHAKAR HALL       : 1990  #: [id number]      Work related injury: yes  Employer at time of injury: [employer]  Employer contact & phone: N/A  Employed elsewhere? no  Workers' Compensation Carrier/Managed Care Plan:  N/A    Today's date: 2018  Date of injury:2018  Date of first visit: 2018    PROVIDER EVALUATION: Please fill in as needed.  Please give copy to employee for employer.  1. Diagnosis: (T23.211A) Partial thickness burn of right thumb, initial encounter  2. Treatment: exam, dressing  3. Medication: bacitracin, Ibuprofen/Tylenol  NOTE: When ordering a medication, [place] Rules require Work Comp or WC on prescriptions.  4. Return to work date: 2018  Restrictions:  Sudhakar Hall is able to return to work with restrictions:    Keep the wound clean, dry, and covered.  Duration of restrictions:  Until rechecked in clinic.      Maximum Medical Improvement (Date): Deferred. To be determined at follow up visit in clinic.  Any Permanent Partial Disability?  Deferred to future exam/consult.    Provider comments: All work comp injuries require a follow up clinic visit for reevaluation and eventual MMI (Maximum Medical Improvement) determination.  It is your responsibility to make your follow up appointment.  Any further work comp forms, disability evaluations, etc, will need to be done through your clinic.        Medical Examiner:  Ernie Aly MD   License or registration: [id number]  [address] [place] [address]  [phone] fax         Next appointment:  1 week.  To be scheduled by patient.     24 Hour Appointment Hotline       To make an appointment at any [facility] clinic, call [phone] ([phone]). If you don't have a family doctor or clinic, we will help you find one. [facility] clinics are conveniently located to serve the needs of you and your family.             Review of your medicines      START taking        Dose / Directions Last dose taken    ibuprofen 200 MG tablet   Commonly known as:  ADVIL/MOTRIN   Dose:  600 mg        Take 3 tablets (600 mg) by mouth every 6 hours as needed for pain or fever   Refills:  0          Our records show that you are taking the medicines listed below. If these are incorrect, please call your family doctor or clinic.        Dose / Directions Last dose taken    EXCEDRIN PO   Dose:  500 mg        Take 500 mg by mouth 2 times daily as needed   Refills:  0        nicotine 10 MG Inhaler   Commonly known as:  NICOTROL   Dose:  6-16 Cartridge   Quantity:  180 each        Inhale 6-16 Cartridges into the lungs daily as needed for smoking cessation   Refills:  1        NYQUIL PO        Refills:  0        sertraline 50 MG tablet   Commonly known as:  ZOLOFT   Dose:  50 mg   Quantity:  30 tablet        Take 1 tablet (50 mg) by mouth daily   Refills:  1                Prescriptions were sent or printed at these locations (1 Prescription)                   [facility] - [place], [place] - [address]   [address] [address]    Telephone:  [phone]   Fax:  [phone]   Hours:                  Not Printed or Sent (1 of 1)         ibuprofen (ADVIL/MOTRIN) 200 MG tablet                Orders Needing Specimen Collection     None      Pending Results     No orders found for last 3 day(s).            Pending Culture Results     No orders found for last 3 day(s).            Pending Results Instructions     If you had any lab results that were not finalized at the time of your Discharge, you can call the ED Lab Result RN at [phone]. You will be contacted by this team for any positive Lab results or changes in treatment. The nurses are available 7 days a week from 10A to 6:30P.  You can leave a message 24 hours per day and they will return your call.        Thank you for choosing [facility]       Thank you for choosing [facility] for your care. Our goal is always to provide you with excellent care. Hearing back from our patients is one way we can continue to improve our services. Please take a few minutes to complete the written survey that you may receive in the mail after you visit with us. Thank you!        [employer] Information     [employer] gives you secure access to your electronic health record. If you see a primary care provider, you can also send messages to your care team and make appointments. If you have questions, please call your primary care clinic.  If you do not have a primary care provider, please call [phone] and they will assist you.        Care EveryWhere ID     This is your Care EveryWhere ID. This could be used by other organizations to access your [facility] medical records  [id number]        Equal Access to Services     JAMIE POLK : José Miguel Winslow, janneth brunson, qaalfonso jay. [facility] [phone].    ATENCIÓN: ananth Morel  zamora disposición servicios gratuitos de asistencia lingüística. Llgabriel al [phone].    We comply with applicable federal civil rights laws and [place] laws. We do not discriminate on the basis of race, color, national origin, age, disability, sex, sexual orientation, or gender identity.            After Visit Summary       This is your record. Keep this with you and show to your community pharmacist(s) and doctor(s) at your next visit.

## 2018-08-11 NOTE — LETTER
REPORT OF WORK ABILITY    PATIENT DATA  Employee Name:   SUDHAKAR HALL       : 1990  #: [id number]      Work related injury: yes  Employer at time of injury: [employer]  Employer contact & phone: N/A  Employed elsewhere? no  Workers' Compensation Carrier/Managed Care Plan:  N/A    Today's date: 2018  Date of injury:2018  Date of first visit: 2018    PROVIDER EVALUATION: Please fill in as needed.  Please give copy to employee for employer.  1. Diagnosis: (T23.211A) Partial thickness burn of right thumb, initial encounter  2. Treatment: exam, dressing  3. Medication: bacitracin, Ibuprofen/Tylenol  NOTE: When ordering a medication, [place] Rules require [place] or WC on prescriptions.  4. Return to work date: 2018  Restrictions:  Sudhakar Hall is able to return to work with restrictions:    Keep the wound clean, dry, and covered.  Duration of restrictions:  Until rechecked in clinic.      Maximum Medical Improvement (Date): Deferred. To be determined at follow up visit in clinic.  Any Permanent Partial Disability? Deferred to future exam/consult.    Provider comments: All work comp injuries require a follow up clinic visit for reevaluation and eventual MMI (Maximum Medical Improvement) determination.  It is your responsibility to make your follow up appointment.  Any further work comp forms, disability evaluations, etc, will need to be done through your clinic.        Medical Examiner:  Ernie Aly MD   License or registration: [id number]  [address] [address]  [phone] fax         Next appointment:  1 week.  To be scheduled by patient.

## 2018-08-12 VITALS
SYSTOLIC BLOOD PRESSURE: 119 MMHG | OXYGEN SATURATION: 99 % | TEMPERATURE: 98.1 F | DIASTOLIC BLOOD PRESSURE: 84 MMHG | RESPIRATION RATE: 20 BRPM

## 2018-08-12 RX ORDER — IBUPROFEN 200 MG
600 TABLET ORAL EVERY 6 HOURS PRN
COMMUNITY
Start: 2018-08-12

## 2018-08-12 NOTE — DISCHARGE INSTRUCTIONS
Keep the wound covered.  Bacitracin to be applied with dressing changes twice a day.  Watch for signs of infection.  Tylenol/ibuprofen as needed for discomfort.  Recheck in clinic in 1 week for reevaluation and eventual MMI determination.  Take the copy of your Report of Workability Form to your employer.  Your copy is below.  Thank you for choosing [facility]. We appreciate the opportunity to meet your urgent medical needs. Please let us know if we could have done anything to make your stay more satisfying.    After discharge, please closely monitor for any new or worsening symptoms. Return to the Emergency Department if you develop any acute worsening signs or symptoms.    If you had lab work, cultures or imaging studies done during your stay, the final results may still be pending. We will call you if your plan of care needs to change. However, if you are not improving as expected, please follow up with your primary care provider or clinic.     Start any prescription medications that were prescribed to you and take them as directed.     Please see additional handouts that may be pertinent to your condition.  Second-Degree Burn  A burn occurs when skin is exposed to too much heat, sun, or harsh chemicals. A second-degree burn (partial-thickness burn) is deeper than a first-degree burn (superficial burn). It usually causes a blister to form. The blister may remain intact and gradually go away on its own. Or it may break open. The goal of treatment is to relieve pain and stop infection while the burn heals.  Home care  Use pain medicine as directed. If no pain medicine was prescribed, you may use over-the-counter medicine to control pain. If you have chronic liver or kidney disease, talk with your healthcare provider before using acetaminophen or ibuprofen. Also talk with your provider if you've had a stomach ulcer or GI bleeding.  General care    On the first day, you may put a cool compress on  the wound to ease pain. A cool compress is a small towel soaked in cool water.    If you were sent home with the blister intact, don't break the blister. The risk for infection is greater if the blister breaks. If a bandage was applied, change it once a day, unless told otherwise. If the bandage becomes wet or soiled, change it as soon as you can.    Sometimes an infection may occur even with proper treatment. Check the burn daily for the signs of infection listed below.    Eat more calories and protein until your wound is healed.    Wear a hat, sunscreen, and long sleeves while in the sun to protect the skin.    Don't pick or scratch at the wound. Use over-the-counter medicines like diphenhydramine for itching.    Avoid tight-fitting clothes.  To change a bandage:    Wash your hands.    Take off the old bandage. If the bandage sticks, soak it off under warm running water.    Once the bandage is off, gently wash the burn area with mild soap and warm water to remove any cream, ointment, ooze, or scab. You may do this in a sink, under a tub faucet, or in the shower. Rinse off the soap and gently pat dry with a clean towel.    Check for signs of infection listed below.    Put any prescribed antibiotic cream or ointment on the wound.    Cover the burn with nonstick gauze. Then wrap it with the bandage material.  Follow-up care  Follow up with your healthcare provider, or as advised.  When to seek medical advice  Call your healthcare provider right away if you have any of these signs of infection:    Fever of 100.4 F (38 C) or higher, or as directed by your healthcare provider    Pain that gets worse    Redness or swelling that gets worse    Pus comes from the burn    Red streaks in your skin coming from the burn    Wound doesn't appear to be healing    Nausea or vomiting   Date Last Reviewed: 1/1/2017 2000-2017 [employer]. [address] [place] [address], [place] [address]. All rights reserved. This information  is not intended as a substitute for professional medical care. Always follow your healthcare professional's instructions.      REPORT OF WORK ABILITY    PATIENT DATA  Employee Name:   SUDHAKAR HALL       : 1990  #: [id number]      Work related injury: yes  Employer at time of injury: [employer]  Employer contact & phone: N/A  Employed elsewhere? no  Workers' Compensation Carrier/Managed Care Plan:  N/A    Today's date: 2018  Date of injury:2018  Date of first visit: 2018    PROVIDER EVALUATION: Please fill in as needed.  Please give copy to employee for employer.  1. Diagnosis: (T23.211A) Partial thickness burn of right thumb, initial encounter  2. Treatment: exam, dressing  3. Medication: bacitracin, Ibuprofen/Tylenol  NOTE: When ordering a medication, [place] Rules require [place] Comp or WC on prescriptions.  4. Return to work date: 2018  Restrictions:  Sudhakar Hall is able to return to work with restrictions:    Keep the wound clean, dry, and covered.  Duration of restrictions:  Until rechecked in clinic.      Maximum Medical Improvement (Date): Deferred. To be determined at follow up visit in clinic.  Any Permanent Partial Disability? Deferred to future exam/consult.    Provider comments: All work comp injuries require a follow up clinic visit for reevaluation and eventual MMI (Maximum Medical Improvement) determination.  It is your responsibility to make your follow up appointment.  Any further work comp forms, disability evaluations, etc, will need to be done through your clinic.        Medical Examiner:  Ernie Aly MD   License or registration: [id number]  [address] [address]  [phone] fax         Next appointment:  1 week.  To be scheduled by patient.

## 2018-08-12 NOTE — ED PROVIDER NOTES
History     Chief Complaint   Patient presents with     Hand Burn     HPI  Sudhakar Abdalla is a 28 year old male who presents to the ED with a burn to the right thumb.  He sustained this while working at [employer].  He got some hot grease from the grill on his hand.  This occurred earlier this evening about 10 PM.  He is right-hand dominant.  He instinctively rubbed his hand on his pants.  The blisters have deflated.    Last tetanus was in November 2012.    Problem List:    Patient Active Problem List    Diagnosis Date Noted     Chronic bilateral low back pain with bilateral sciatica 06/21/2018     Priority: Medium     Anger 06/21/2018     Priority: Medium     Premature ejaculation 06/21/2018     Priority: Medium     Tobacco use disorder 12/28/2016     Priority: Medium     History of ADHD 06/01/2016     Priority: Medium     Hx of migraines 06/01/2016     Priority: Medium     Hx of scoliosis 06/01/2016     Priority: Medium        Past Medical History:    Past Medical History:   Diagnosis Date     History of ADHD 6/1/2016     Hx of migraines 6/1/2016     Hx of scoliosis 6/1/2016       Past Surgical History:    History reviewed. No pertinent surgical history.    Family History:    Family History   Problem Relation Age of Onset     Diabetes Mother      Coronary Artery Disease Mother      Aneurysm Father      Alzheimer Disease Paternal Grandfather        Social History:  Marital Status:  Single [1]  Social History   Substance Use Topics     Smoking status: Current Every Day Smoker     Packs/day: 1.50     Types: Cigarettes     Smokeless tobacco: Former User     Alcohol use 0.0 oz/week     0 Standard drinks or equivalent per week      Comment: rare        Medications:      ibuprofen (ADVIL/MOTRIN) 200 MG tablet   Aspirin-Acetaminophen-Caffeine (EXCEDRIN PO)   nicotine (NICOTROL) 10 MG Inhaler   Pseudoeph-Doxylamine-DM-APAP (NYQUIL PO)   sertraline (ZOLOFT) 50 MG tablet         Review of Systems   All other systems  reviewed and are negative.      Physical Exam   BP: 111/83  Heart Rate: 95  Temp: 98.3  F (36.8  C)  Resp: 19  SpO2: 99 %      Physical Exam   Constitutional: He is oriented to person, place, and time. He appears well-developed and well-nourished. No distress.   Pulmonary/Chest: Effort normal. No respiratory distress.   Musculoskeletal:        Right hand: He exhibits tenderness. Normal sensation noted. Normal strength noted.   Deflated blister along the radial aspect of the thumb and slightly down onto the thenar eminence.  Mild erythema.   Neurological: He is alert and oriented to person, place, and time.   Skin: Skin is warm and dry. Burn ( Second-degree of the right thumb) noted.        Psychiatric: He has a normal mood and affect.       ED Course  (with Medical Decision Making)      A 8-year-old gentleman sustained a second-degree burn to the right thumb at work.  He gets some hot grease on his thumb from the flattop grill.  He rubbed his hands and his pants and deflated the blisters.  There is no signs of infection.  Bacitracin and bandage were applied.  He is up-to-date on his tetanus.  Report of Work Ability Form was completed.  Copy below.       ED Course     Procedures               Critical Care time:  none               No results found for this or any previous visit (from the past 24 hour(s)).    Medications - No data to display    Assessments & Plan      I have reviewed the nursing notes.    I have reviewed the findings, diagnosis, plan and need for follow up with the patient.        REPORT OF WORK ABILITY    PATIENT DATA  Employee Name:   GEORGE HALL       : 1990  #: [id number]      Work related injury: yes  Employer at time of injury: [employer]  Employer contact & phone: N/A  Employed elsewhere? no  Workers' Compensation Carrier/Managed Care Plan:  N/A    Today's date: 2018  Date of injury:2018  Date of first visit: 2018    PROVIDER EVALUATION: Please  fill in as needed.  Please give copy to employee for employer.  1. Diagnosis: (T23.211A) Partial thickness burn of right thumb, initial encounter  2. Treatment: exam, dressing  3. Medication: bacitracin, Ibuprofen/Tylenol  NOTE: When ordering a medication, [place] Rules require [place] or WC on prescriptions.  4. Return to work date: 8/12/2018  Restrictions:  Sudhakar Abdalla is able to return to work with restrictions:    Keep the wound clean, dry, and covered.  Duration of restrictions:  Until rechecked in clinic.      Maximum Medical Improvement (Date): Deferred. To be determined at follow up visit in clinic.  Any Permanent Partial Disability? Deferred to future exam/consult.    Provider comments: All work comp injuries require a follow up clinic visit for reevaluation and eventual MMI (Maximum Medical Improvement) determination.  It is your responsibility to make your follow up appointment.  Any further work comp forms, disability evaluations, etc, will need to be done through your clinic.        Medical Examiner:  Ernie Aly MD   License or registration: [id number]  [address] [address]  [phone] fax         Next appointment:  1 week.  To be scheduled by patient.         New Prescriptions    IBUPROFEN (ADVIL/MOTRIN) 200 MG TABLET    Take 3 tablets (600 mg) by mouth every 6 hours as needed for pain or fever       Final diagnoses:   Partial thickness burn of right thumb, initial encounter       8/11/2018   [facility] EMERGENCY DEPARTMENT     Ernie Echols MD  08/12/18 0022

## 2018-08-12 NOTE — ED TRIAGE NOTES
Pt presents with concerns of a burn on right hand.  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  Incident occurred at about 2200.

## 2018-08-21 DIAGNOSIS — R45.4 ANGER: ICD-10-CM

## 2018-08-21 DIAGNOSIS — F52.4 PREMATURE EJACULATION: ICD-10-CM

## 2018-08-21 NOTE — LETTER
[facility]  [address] [address]  [phone]          August 23, 2018    Sudhakar Abdalla                                                                                                                     [address]  [place] [address]            Vitaly De La Fuente,    We tried to contact you by phone but were unable to connect with you because the phone number we have for you is disconnected.     We wanted to let you know that your Zoloft has been refilled.  Please schedule a visit to follow up on how this medication is working for you before your next refill.  We need to be sure everything is working well and stable prior to further refills.     Sincerely,     Your [facility] Care Team

## 2018-08-22 NOTE — TELEPHONE ENCOUNTER
"Zoloft  Routing refill request to provider for review/approval because:  Patient needs to be seen because:  Overdue for mood follow up  Unable to approve for \"anger\"    Nini Luevano, RN, BSN          "

## 2018-09-21 DIAGNOSIS — R45.4 ANGER: ICD-10-CM

## 2018-09-21 DIAGNOSIS — F52.4 PREMATURE EJACULATION: ICD-10-CM

## 2018-09-21 NOTE — TELEPHONE ENCOUNTER
Pt is overdue for follow up.  Please schedule follow up visit.  Once scheduled, can refill enough medication to get patient to the visit.

## 2018-09-21 NOTE — TELEPHONE ENCOUNTER
"Requested Prescriptions   Pending Prescriptions Disp Refills     sertraline (ZOLOFT) 50 MG tablet [Pharmacy Med Name: SERTRALINE HCL 50MG TABS] 30 tablet 0     Sig: TAKE ONE TABLET BY MOUTH EVERY DAY    SSRIs Protocol Passed    9/21/2018  6:43 AM       Passed - Recent (12 mo) or future (30 days) visit within the authorizing provider's specialty    Patient had office visit in the last 12 months or has a visit in the next 30 days with authorizing provider or within the authorizing provider's specialty.  See \"Patient Info\" tab in inbasket, or \"Choose Columns\" in Meds & Orders section of the refill encounter.           Passed - Patient is age 18 or older        Last OV 06/21/2018  Last filled 08/22/2018    Routing refill request to provider for review/approval because:  Echo given x1 and patient did not follow up, please advise          "

## 2018-09-24 NOTE — TELEPHONE ENCOUNTER
Spoke with patient appointment scheduled. He has one pill left.  Patient also wanted me to let you know that he has not heard back from the chiropractor. I called [facility] [phone], they will call him to get him set up.      Next 5 appointments (look out 90 days)     Oct 18, 2018 10:00 AM CDT   Office Visit with Delbert Wade MD   [facility] ([facility])    [address] [address] [phone]                Thanks  Oneida Juarez RT (R)

## 2018-09-27 ENCOUNTER — HOSPITAL ENCOUNTER (EMERGENCY)
Facility: CLINIC | Age: 28
Discharge: HOME | End: 2018-09-27
Attending: FAMILY MEDICINE | Admitting: FAMILY MEDICINE
Payer: MEDICAID

## 2018-09-27 ENCOUNTER — APPOINTMENT (OUTPATIENT)
Dept: ULTRASOUND IMAGING | Facility: CLINIC | Age: 28
End: 2018-09-27
Attending: FAMILY MEDICINE
Payer: MEDICAID

## 2018-09-27 ENCOUNTER — PATIENT OUTREACH (OUTPATIENT)
Dept: CARE COORDINATION | Facility: CLINIC | Age: 28
End: 2018-09-27

## 2018-09-27 ENCOUNTER — APPOINTMENT (OUTPATIENT)
Dept: GENERAL RADIOLOGY | Facility: CLINIC | Age: 28
End: 2018-09-27
Attending: FAMILY MEDICINE
Payer: MEDICAID

## 2018-09-27 VITALS
DIASTOLIC BLOOD PRESSURE: 86 MMHG | BODY MASS INDEX: 21.98 KG/M2 | SYSTOLIC BLOOD PRESSURE: 125 MMHG | OXYGEN SATURATION: 97 % | RESPIRATION RATE: 16 BRPM | TEMPERATURE: 97.9 F | WEIGHT: 130 LBS

## 2018-09-27 DIAGNOSIS — R10.13 ABDOMINAL PAIN, EPIGASTRIC: ICD-10-CM

## 2018-09-27 DIAGNOSIS — R11.10 VOMITING AND DIARRHEA: ICD-10-CM

## 2018-09-27 DIAGNOSIS — R19.7 VOMITING AND DIARRHEA: ICD-10-CM

## 2018-09-27 DIAGNOSIS — K85.00 IDIOPATHIC ACUTE PANCREATITIS WITHOUT INFECTION OR NECROSIS: ICD-10-CM

## 2018-09-27 LAB
ALBUMIN SERPL-MCNC: 3.6 G/DL (ref 3.4–5)
ALBUMIN UR-MCNC: NEGATIVE MG/DL
ALP SERPL-CCNC: 94 U/L (ref 40–150)
ALT SERPL W P-5'-P-CCNC: 28 U/L (ref 0–70)
ANION GAP SERPL CALCULATED.3IONS-SCNC: 9 MMOL/L (ref 3–14)
APPEARANCE UR: CLEAR
AST SERPL W P-5'-P-CCNC: 13 U/L (ref 0–45)
BASOPHILS # BLD AUTO: 0 10E9/L (ref 0–0.2)
BASOPHILS NFR BLD AUTO: 0.2 %
BILIRUB SERPL-MCNC: 0.2 MG/DL (ref 0.2–1.3)
BILIRUB UR QL STRIP: NEGATIVE
BUN SERPL-MCNC: 13 MG/DL (ref 7–30)
CALCIUM SERPL-MCNC: 8.4 MG/DL (ref 8.5–10.1)
CHLORIDE SERPL-SCNC: 107 MMOL/L (ref 94–109)
CO2 SERPL-SCNC: 26 MMOL/L (ref 20–32)
COLOR UR AUTO: YELLOW
CREAT SERPL-MCNC: 0.82 MG/DL (ref 0.66–1.25)
DIFFERENTIAL METHOD BLD: NORMAL
EOSINOPHIL NFR BLD AUTO: 1.8 %
ERYTHROCYTE [DISTWIDTH] IN BLOOD BY AUTOMATED COUNT: 13.2 % (ref 10–15)
GFR SERPL CREATININE-BSD FRML MDRD: >90 ML/MIN/1.7M2
GLUCOSE SERPL-MCNC: 92 MG/DL (ref 70–99)
GLUCOSE UR STRIP-MCNC: NEGATIVE MG/DL
HCT VFR BLD AUTO: 40.6 % (ref 40–53)
HGB BLD-MCNC: 14.1 G/DL (ref 13.3–17.7)
HGB UR QL STRIP: NEGATIVE
IMM GRANULOCYTES # BLD: 0 10E9/L (ref 0–0.4)
IMM GRANULOCYTES NFR BLD: 0.1 %
KETONES UR STRIP-MCNC: NEGATIVE MG/DL
LEUKOCYTE ESTERASE UR QL STRIP: NEGATIVE
LIPASE SERPL-CCNC: 471 U/L (ref 73–393)
LYMPHOCYTES # BLD AUTO: 3.8 10E9/L (ref 0.8–5.3)
LYMPHOCYTES NFR BLD AUTO: 42.7 %
MCH RBC QN AUTO: 31.2 PG (ref 26.5–33)
MCHC RBC AUTO-ENTMCNC: 34.7 G/DL (ref 31.5–36.5)
MCV RBC AUTO: 90 FL (ref 78–100)
MONOCYTES # BLD AUTO: 0.9 10E9/L (ref 0–1.3)
MONOCYTES NFR BLD AUTO: 9.5 %
MUCOUS THREADS #/AREA URNS LPF: PRESENT /LPF
NEUTROPHILS # BLD AUTO: 4.1 10E9/L (ref 1.6–8.3)
NEUTROPHILS NFR BLD AUTO: 45.7 %
NITRATE UR QL: NEGATIVE
NRBC # BLD AUTO: 0 10*3/UL
NRBC BLD AUTO-RTO: 0 /100
PH UR STRIP: 7 PH (ref 5–7)
PLATELET # BLD AUTO: 201 10E9/L (ref 150–450)
POTASSIUM SERPL-SCNC: 3.9 MMOL/L (ref 3.4–5.3)
PROT SERPL-MCNC: 6.8 G/DL (ref 6.8–8.8)
RBC # BLD AUTO: 4.52 10E12/L (ref 4.4–5.9)
RBC #/AREA URNS AUTO: <1 /HPF (ref 0–2)
SODIUM SERPL-SCNC: 142 MMOL/L (ref 133–144)
SOURCE: ABNORMAL
SP GR UR STRIP: 1.01 (ref 1–1.03)
SPERM #/AREA URNS HPF: PRESENT /HPF
SQUAMOUS #/AREA URNS AUTO: <1 /HPF (ref 0–1)
UROBILINOGEN UR STRIP-MCNC: 2 MG/DL (ref 0–2)
WBC # BLD AUTO: 9 10E9/L (ref 4–11)
WBC #/AREA URNS AUTO: 1 /HPF (ref 0–5)

## 2018-09-27 PROCEDURE — 25000128 H RX IP 250 OP 636: Performed by: FAMILY MEDICINE

## 2018-09-27 PROCEDURE — 96375 TX/PRO/DX INJ NEW DRUG ADDON: CPT | Performed by: FAMILY MEDICINE

## 2018-09-27 PROCEDURE — 96361 HYDRATE IV INFUSION ADD-ON: CPT | Performed by: FAMILY MEDICINE

## 2018-09-27 PROCEDURE — 99285 EMERGENCY DEPT VISIT HI MDM: CPT | Mod: Z6 | Performed by: FAMILY MEDICINE

## 2018-09-27 PROCEDURE — 99285 EMERGENCY DEPT VISIT HI MDM: CPT | Mod: 25 | Performed by: FAMILY MEDICINE

## 2018-09-27 PROCEDURE — 81001 URINALYSIS AUTO W/SCOPE: CPT | Performed by: FAMILY MEDICINE

## 2018-09-27 PROCEDURE — 83690 ASSAY OF LIPASE: CPT | Performed by: FAMILY MEDICINE

## 2018-09-27 PROCEDURE — 74019 RADEX ABDOMEN 2 VIEWS: CPT | Mod: TC

## 2018-09-27 PROCEDURE — 80053 COMPREHEN METABOLIC PANEL: CPT | Performed by: FAMILY MEDICINE

## 2018-09-27 PROCEDURE — 76705 ECHO EXAM OF ABDOMEN: CPT

## 2018-09-27 PROCEDURE — 96374 THER/PROPH/DIAG INJ IV PUSH: CPT | Performed by: FAMILY MEDICINE

## 2018-09-27 PROCEDURE — 25000132 ZZH RX MED GY IP 250 OP 250 PS 637: Performed by: FAMILY MEDICINE

## 2018-09-27 PROCEDURE — 85025 COMPLETE CBC W/AUTO DIFF WBC: CPT | Performed by: FAMILY MEDICINE

## 2018-09-27 RX ORDER — HYDROCODONE BITARTRATE AND ACETAMINOPHEN 5; 325 MG/1; MG/1
1-2 TABLET ORAL EVERY 8 HOURS PRN
Qty: 6 TABLET | Refills: 0 | Status: SHIPPED | OUTPATIENT
Start: 2018-09-27 | End: 2018-10-15

## 2018-09-27 RX ORDER — HYDROMORPHONE HYDROCHLORIDE 1 MG/ML
0.5 INJECTION, SOLUTION INTRAMUSCULAR; INTRAVENOUS; SUBCUTANEOUS ONCE
Status: COMPLETED | OUTPATIENT
Start: 2018-09-27 | End: 2018-09-27

## 2018-09-27 RX ORDER — ONDANSETRON 4 MG/1
4 TABLET, ORALLY DISINTEGRATING ORAL EVERY 6 HOURS PRN
Qty: 8 TABLET | Refills: 0 | Status: SHIPPED | OUTPATIENT
Start: 2018-09-27 | End: 2018-10-02

## 2018-09-27 RX ORDER — HYOSCYAMINE SULFATE 0.125 MG
0.25 TABLET,DISINTEGRATING ORAL EVERY 4 HOURS PRN
Status: DISCONTINUED | OUTPATIENT
Start: 2018-09-27 | End: 2018-09-27 | Stop reason: HOSPADM

## 2018-09-27 RX ORDER — KETOROLAC TROMETHAMINE 30 MG/ML
30 INJECTION, SOLUTION INTRAMUSCULAR; INTRAVENOUS ONCE
Status: COMPLETED | OUTPATIENT
Start: 2018-09-27 | End: 2018-09-27

## 2018-09-27 RX ADMIN — KETOROLAC TROMETHAMINE 30 MG: 30 INJECTION, SOLUTION INTRAMUSCULAR at 00:53

## 2018-09-27 RX ADMIN — Medication 0.5 MG: at 01:37

## 2018-09-27 RX ADMIN — SODIUM CHLORIDE 1000 ML: 9 INJECTION, SOLUTION INTRAVENOUS at 00:42

## 2018-09-27 RX ADMIN — HYOSCYAMINE SULFATE 0.25 MG: 0.12 TABLET, ORALLY DISINTEGRATING ORAL at 00:56

## 2018-09-27 NOTE — ED PROVIDER NOTES
[facility] ED Provider Note   CC:     Chief Complaint   Patient presents with     Abdominal Pain     HPI:  Sudhakar Abdalla is a 28 year old male who presented to the emergency department by EMS with vomiting and diarrhea.  Patient reports onset of abdominal pain this evening at around 6:15 PM.  He was at work at the time, and had a chicken, willard wrap and within 15 minutes started to have both vomiting and diarrhea.  Patient states that he is a  and started to get sick Saturday.  He started to have some vomiting and diarrhea at work.  He was able to make it through the shift, and symptoms continued into Sunday.  He had to leave for a couple of hours, but came back to finish work.  He had taken antidiarrheal medication, and slept most of the day Monday and Tuesday.  Today he was at work when he first developed the pain.  He then had protracted episodes of vomiting and diarrhea.  He has not had any for the last few hours, but his pain started to increase.  Patient denies any blood or mucus in the stool.  He opened up a can of chicken to make a chicken salad early Saturday morning/Friday night.  Patient is not on any antibiotics, and there is been no recent travel.  There is been no known sick exposure.  Current pain levels rated 6/10.  Patient received Zofran and fentanyl by paramedics.    Problem List:  Patient Active Problem List    Diagnosis     Chronic bilateral low back pain with bilateral sciatica     Anger     Premature ejaculation     Tobacco use disorder     History of ADHD     Hx of migraines     Hx of scoliosis       MEDS:   Previous Medications    ASPIRIN-ACETAMINOPHEN-CAFFEINE (EXCEDRIN PO)    Take 500 mg by mouth 2 times daily as needed    IBUPROFEN (ADVIL/MOTRIN) 200 MG TABLET    Take 3 tablets (600 mg) by mouth every 6 hours as needed for pain or fever    NICOTINE (NICOTROL) 10 MG INHALER    Inhale 6-16 Cartridges into the  lungs daily as needed for smoking cessation    SERTRALINE (ZOLOFT) 50 MG TABLET    TAKE ONE TABLET BY MOUTH EVERY DAY       ALLERGIES:  No Known Allergies    History reviewed. No pertinent surgical history.    Social History   Substance Use Topics     Smoking status: Current Every Day Smoker     Packs/day: 1.50     Types: Cigarettes     Smokeless tobacco: Former User     Alcohol use 0.0 oz/week     0 Standard drinks or equivalent per week      Comment: rare         Review of Systems   Except as noted in HPI, all other systems were reviewed and are negative    Physical Exam     Vitals were reviewed  Patient Vitals for the past 8 hrs:   BP Temp Temp src Heart Rate Resp SpO2 Weight   09/27/18 0330 124/83 - - - - 97 % -   09/27/18 0315 - - - - - 92 % -   09/27/18 0301 - - - - - 99 % -   09/27/18 0300 136/60 - - - - - -   09/27/18 0230 108/77 - - - - 98 % -   09/27/18 0200 112/73 - - - - 94 % -   09/27/18 0130 118/86 - - - - 98 % -   09/27/18 0118 124/88 - - - - 98 % -   09/27/18 0057 118/81 - - - - 98 % -   09/27/18 0025 121/83 98.1  F (36.7  C) Oral 81 16 98 % 59 kg (130 lb)     GENERAL APPEARANCE: Alert, moderate distress due to abdominal pain  FACE: normal facies  EYES: Pupils are equal; sclerae nonicteric  HENT: normal external exam; mucous membranes are moist  NECK: no adenopathy or asymmetry  RESP: normal respiratory effort; clear breath sounds bilaterally  CV: regular rate and rhythm; no significant murmurs, gallops or rubs  ABD: soft, mid abdominal tenderness; no rebound or guarding; bowel sounds are diminished  MS: no gross deformities noted; normal muscle tone.  EXT: No calf tenderness or pitting edema  SKIN: no worrisome rash  NEURO: no facial droop; no focal deficits, speech is normal  PSYCH: normal mood and affect      Available Lab/Imaging Results     Results for orders placed or performed during the hospital encounter of 09/27/18 (from the past 24 hour(s))   CBC with platelets differential   Result Value  Ref Range    WBC 9.0 4.0 - 11.0 10e9/L    RBC Count 4.52 4.4 - 5.9 10e12/L    Hemoglobin 14.1 13.3 - 17.7 g/dL    Hematocrit 40.6 40.0 - 53.0 %    MCV 90 78 - 100 fl    MCH 31.2 26.5 - 33.0 pg    MCHC 34.7 31.5 - 36.5 g/dL    RDW 13.2 10.0 - 15.0 %    Platelet Count 201 150 - 450 10e9/L    Diff Method Automated Method     % Neutrophils 45.7 %    % Lymphocytes 42.7 %    % Monocytes 9.5 %    % Eosinophils 1.8 %    % Basophils 0.2 %    % Immature Granulocytes 0.1 %    Nucleated RBCs 0 0 /100    Absolute Neutrophil 4.1 1.6 - 8.3 10e9/L    Absolute Lymphocytes 3.8 0.8 - 5.3 10e9/L    Absolute Monocytes 0.9 0.0 - 1.3 10e9/L    Absolute Basophils 0.0 0.0 - 0.2 10e9/L    Abs Immature Granulocytes 0.0 0 - 0.4 10e9/L    Absolute Nucleated RBC 0.0    Comprehensive metabolic panel   Result Value Ref Range    Sodium 142 133 - 144 mmol/L    Potassium 3.9 3.4 - 5.3 mmol/L    Chloride 107 94 - 109 mmol/L    Carbon Dioxide 26 20 - 32 mmol/L    Anion Gap 9 3 - 14 mmol/L    Glucose 92 70 - 99 mg/dL    Urea Nitrogen 13 7 - 30 mg/dL    Creatinine 0.82 0.66 - 1.25 mg/dL    GFR Estimate >90 >60 mL/min/1.7m2    GFR Estimate If Black >90 >60 mL/min/1.7m2    Calcium 8.4 (L) 8.5 - 10.1 mg/dL    Bilirubin Total 0.2 0.2 - 1.3 mg/dL    Albumin 3.6 3.4 - 5.0 g/dL    Protein Total 6.8 6.8 - 8.8 g/dL    Alkaline Phosphatase 94 40 - 150 U/L    ALT 28 0 - 70 U/L    AST 13 0 - 45 U/L   Lipase   Result Value Ref Range    Lipase 471 (H) 73 - 393 U/L   XR Abdomen 2 Views    Narrative    ABDOMEN 2 VIEWS  9/27/2018 1:17 AM     HISTORY: Vomiting and diarrhea. Abdominal pain.    COMPARISON: None.    FINDINGS: Gas is scattered within nondilated small and large bowel. A  minimal amount of stool is present within the colon. No visualized  bowel wall thickening, pneumatosis or free intraperitoneal gas.      Impression    IMPRESSION: No evidence of bowel obstruction.    CECI PIMENTEL MD   Routine UA with microscopic   Result Value Ref Range    Color Urine  Yellow     Appearance Urine Clear     Glucose Urine Negative NEG^Negative mg/dL    Bilirubin Urine Negative NEG^Negative    Ketones Urine Negative NEG^Negative mg/dL    Specific Gravity Urine 1.015 1.003 - 1.035    Blood Urine Negative NEG^Negative    pH Urine 7.0 5.0 - 7.0 pH    Protein Albumin Urine Negative NEG^Negative mg/dL    Urobilinogen mg/dL 2.0 0.0 - 2.0 mg/dL    Nitrite Urine Negative NEG^Negative    Leukocyte Esterase Urine Negative NEG^Negative    Source Midstream Urine     WBC Urine 1 0 - 5 /HPF    RBC Urine <1 0 - 2 /HPF    Squamous Epithelial /HPF Urine <1 0 - 1 /HPF    Mucous Urine Present (A) NEG^Negative /LPF    sperm Present (A) NEG^Negative /HPF   US Abdomen Limited    Narrative    ULTRASOUND ABDOMEN LIMITED RIGHT UPPER QUADRANT  9/27/2018 3:04 AM     HISTORY: Right upper quadrant and epigastric abdominal pain.    COMPARISON: None.    FINDINGS: Normal hepatic echogenicity. No hepatic masses. The  gallbladder is unremarkable without gallstones, wall thickening or  pericholecystic fluid. No focal tenderness over the gallbladder. No  intra- or extrahepatic biliary dilatation. The common duct measures  0.3 cm in diameter. The right kidney has normal size and echogenicity,  measuring 11.0 cm in length. No right intrarenal collecting system  dilatation, calculi or masses. No free fluid in the upper right  hemiabdomen.      Impression    IMPRESSION:  1. Unremarkable appearance of the gallbladder and liver.  2. No biliary dilatation.    CECI PIMENTEL MD            Impression     Final diagnoses:   Abdominal pain, epigastric   Vomiting and diarrhea   Idiopathic acute pancreatitis         ED Course & Medical Decision Making   Sudhakar Abdalla is a 28 year old male who presented to the emergency department with acute onset of vomiting and diarrhea associated with abdominal pain.  Patient had been sick this past weekend on Saturday and Sunday, slept most of the day on Monday and Tuesday, return to work  today and developed abdominal pain associated with recurrence of the vomiting and diarrhea.  Patient was seen shortly after arrival.  He was pointing to the area of the right upper quadrant and epigastric region as the location of his pain.  Patient received IV fluids, Toradol, Levsin, and then Dilaudid.  His initial vital signs revealed a temp of 98.1, blood pressure 121/83, heart rate of 81, respiratory rate of 16 and 98% oxygen saturation.  Exam revealed epigastric tenderness.  Laboratory workup reveals normal CBC, comprehensive metabolic panel including liver enzyme but an elevated lipase level of 471.  X-ray of the abdomen revealed scattered gas with nondilated loops of bowel.  No signs of obstruction.  Given the elevated lipase level, a right upper quadrant ultrasound was performed and reveals an unremarkable appearance of the gallbladder and liver.  There is no biliary dilatation.  Patient's pain is better.  He has a mild pancreatitis, which is likely idiopathic and may be related to his acute vomiting and diarrhea.  I recommended continuing with sips of clear liquids.  Take Zofran as needed for nausea, and reserve Vicodin for moderate to severe breakthrough pain.  Follow-up in 2-5 days if not improving.  Return to the ED at any time if symptoms acutely worsen.             Written after-visit summary and instructions were given at the time of discharge.      New Prescriptions    HYDROCODONE-ACETAMINOPHEN (NORCO) 5-325 MG PER TABLET    Take 1-2 tablets by mouth every 8 hours as needed for severe pain    ONDANSETRON (ZOFRAN ODT) 4 MG ODT TAB    Take 1 tablet (4 mg) by mouth every 6 hours as needed for nausea            This note was dictated using electronic voice recognition software and although reviewed, may contain grammatical and spelling errors.        Emerson Lui MD  09/27/18 1160

## 2018-09-27 NOTE — ED AVS SNAPSHOT
[facility] Emergency Department    [address] [place] [address]    Phone:  [phone]    Fax:  [phone]                                       Sudhakar Abdalla   MRN: [id number]    Department:  [facility] Emergency Department   Date of Visit:  9/27/2018           Patient Information     Date Of Birth          1990        Your diagnoses for this visit were:     Abdominal pain, epigastric     Vomiting and diarrhea     Idiopathic acute pancreatitis        You were seen by Emerson Lui MD.      Follow-up Information     Follow up with Delbert Wade MD In 2 days.    Specialty:  Family Practice    Why:  if not improving    Contact information:    [address] [place] [id number] [phone]          Follow up with [facility] Emergency Department.    Specialty:  EMERGENCY MEDICINE    Why:  If symptoms worsen    Contact information:    [address] [place] [id number] [phone]    Additional information:    From [address]: Exit at [employer] on south side of [place]. Turn right on [address]. Turn left at stoplight on [address]. [facility] will be in view two blocks ahead        Discharge Instructions       Thank you for giving us the opportunity to see you. The impression is that you have a gastrointestinal illness with vomiting and diarrhea.  This resulted in mild inflammation of your pancreas.    Stay with a clear liquid diet, and take Zofran as needed for nausea.  Reserve Vicodin for moderate to severe pain at nighttime.    The following medications were given during your stay: IV fluids, Toradol, Zofran, Dilaudid    Since you received an opiate pain medication or sedative during your visit, please do not drive for at least 8 hours.     If you had lab work, cultures or imaging studies done during your stay, the final results may still be pending. We will call you if your plan of care needs to change.     If  you are not seeing an improvement within 2-5 days, please follow up with your primary care provider or clinic.     After discharge, please closely monitor for any new or worsening symptoms. Return to the Emergency Department at any time if your symptoms worsen.        Discharge References/Attachments     PANCREATITIS, UNDERSTANDING (ENGLISH)      Your next 10 appointments already scheduled     Oct 01, 2018  9:15 AM CDT   (Arrive by 9:00 AM)   [facility] Chiropractor with Lidia Hancock DC   [facility] ([facility])    [address] [place] [address]   [phone]            Oct 08, 2018  1:00 PM CDT   [facility] Chiropractor with Lidia Hancock DC   [facility] ([facility])    [address] [place] [address]   [phone]            Oct 15, 2018  1:00 PM CDT   [facility]RETT Chiropractor with Lidia Hancock DC   [facility] ([facility])    [address] [place] [address]   [phone]            Oct 18, 2018 10:00 AM CDT   Office Visit with Delbert Wade MD   [facility] ([facility])    [address] [place] [address] [phone]           Bring a current list of meds and any records pertaining to this visit. For Physicals, please bring immunization records and any forms needing to be filled out. Please arrive 10 minutes early to complete paperwork.              24 Hour Appointment Hotline       To make an appointment at any [facility], call [phone] ([phone]). If you don't have a family doctor or clinic, we will help you find one. [facility] are conveniently located to serve the needs of you and your family.             Review of your medicines      START taking        Dose / Directions Last dose taken    HYDROcodone-acetaminophen 5-325 MG per tablet   Commonly known as:  NORCO   Dose:  1-2 tablet   Quantity:  6 tablet         Take 1-2 tablets by mouth every 8 hours as needed for severe pain   Refills:  0        ondansetron 4 MG ODT tab   Commonly known as:  ZOFRAN ODT   Dose:  4 mg   Quantity:  8 tablet        Take 1 tablet (4 mg) by mouth every 6 hours as needed for nausea   Refills:  0          Our records show that you are taking the medicines listed below. If these are incorrect, please call your family doctor or clinic.        Dose / Directions Last dose taken    EXCEDRIN PO   Dose:  500 mg        Take 500 mg by mouth 2 times daily as needed   Refills:  0        ibuprofen 200 MG tablet   Commonly known as:  ADVIL/MOTRIN   Dose:  600 mg        Take 3 tablets (600 mg) by mouth every 6 hours as needed for pain or fever   Refills:  0        nicotine 10 MG Inhaler   Commonly known as:  NICOTROL   Dose:  6-16 Cartridge   Quantity:  180 each        Inhale 6-16 Cartridges into the lungs daily as needed for smoking cessation   Refills:  1        sertraline 50 MG tablet   Commonly known as:  ZOLOFT   Quantity:  30 tablet        TAKE ONE TABLET BY MOUTH EVERY DAY   Refills:  0                Information about OPIOIDS     PRESCRIPTION OPIOIDS: WHAT YOU NEED TO KNOW   We gave you an opioid (narcotic) pain medicine. It is important to manage your pain, but opioids are not always the best choice. You should first try all the other options your care team gave you. Take this medicine for as short a time (and as few doses) as possible.    Some activities can increase your pain, such as bandage changes or therapy sessions. It may help to take your pain medicine 30 to 60 minutes before these activities. Reduce your stress by getting enough sleep, working on hobbies you enjoy and practicing relaxation or meditation. Talk to your care team about ways to manage your pain beyond prescription opioids.    These medicines have risks:    DO NOT drive when on new or higher doses of pain medicine. These medicines can affect your alertness and reaction  times, and you could be arrested for driving under the influence (DUI). If you need to use opioids long-term, talk to your care team about driving.    DO NOT operate heavy machinery    DO NOT do any other dangerous activities while taking these medicines.    DO NOT drink any alcohol while taking these medicines.     If the opioid prescribed includes acetaminophen, DO NOT take with any other medicines that contain acetaminophen. Read all labels carefully. Look for the word  acetaminophen  or  Tylenol.  Ask your pharmacist if you have questions or are unsure.    You can get addicted to pain medicines, especially if you have a history of addiction (chemical, alcohol or substance dependence). Talk to your care team about ways to reduce this risk.    All opioids tend to cause constipation. Drink plenty of water and eat foods that have a lot of fiber, such as fruits, vegetables, prune juice, apple juice and high-fiber cereal. Take a laxative (Miralax, milk of magnesia, Colace, Senna) if you don t move your bowels at least every other day. Other side effects include upset stomach, sleepiness, dizziness, throwing up, tolerance (needing more of the medicine to have the same effect), physical dependence and slowed breathing.    Store your pills in a secure place, locked if possible. We will not replace any lost or stolen medicine. If you don t finish your medicine, please throw away (dispose) as directed by your pharmacist. The [place] [employer]ution [employer] has more information about safe disposal: https://www.pca.[facility].[place].us/living-green/managing-unwanted-medications        Prescriptions were sent or printed at these locations (2 Prescriptions)                   [facility] - [address] [address]    Telephone:  [phone]   Fax:  [phone]   Hours:                  E-Prescribed (1 of 2)         ondansetron (ZOFRAN ODT) 4 MG ODT tab                  Printed at Department/Unit printer (1 of 2)         HYDROcodone-acetaminophen (NORCO) 5-325 MG per tablet                Procedures and tests performed during your visit     CBC with platelets differential    Comprehensive metabolic panel    Lipase    Routine UA with microscopic    US Abdomen Limited    XR Abdomen 2 Views      Orders Needing Specimen Collection     None      Pending Results     No orders found from 9/25/2018 to 9/28/2018.            Pending Culture Results     No orders found from 9/25/2018 to 9/28/2018.            Pending Results Instructions     If you had any lab results that were not finalized at the time of your Discharge, you can call the ED Lab Result RN at [phone]. You will be contacted by this team for any positive Lab results or changes in treatment. The nurses are available 7 days a week from 10A to 6:30P.  You can leave a message 24 hours per day and they will return your call.        Thank you for choosing [facility]       Thank you for choosing [facility] for your care. Our goal is always to provide you with excellent care. Hearing back from our patients is one way we can continue to improve our services. Please take a few minutes to complete the written survey that you may receive in the mail after you visit with us. Thank you!        [employer] Information     [employer] gives you secure access to your electronic health record. If you see a primary care provider, you can also send messages to your care team and make appointments. If you have questions, please call your primary care clinic.  If you do not have a primary care provider, please call [phone] and they will assist you.        Care EveryWhere ID     This is your Care EveryWhere ID. This could be used by other organizations to access your [facility] medical records  [id number]        Equal Access to Services     YUN POLK : janneth Henry qaybta kaalmada adeegyada, waxay idiin  yelena merida ah. So [facility] [phone].    ATENCIÓN: Si habla español, tiene a zamora disposición servicios gratuitos de asistencia lingüística. Llame al [phone].    We comply with applicable federal civil rights laws and [place] laws. We do not discriminate on the basis of race, color, national origin, age, disability, sex, sexual orientation, or gender identity.            After Visit Summary       This is your record. Keep this with you and show to your community pharmacist(s) and doctor(s) at your next visit.

## 2018-09-27 NOTE — ED TRIAGE NOTES
"Reports right lower abd pain that started about 6pm this evening. States he ate about 20 minutes after eating \"it came out both ends\". States he had diarrhea all day Saturday and Sunday that he took antidiarrheals for and had some relief.   "

## 2018-09-27 NOTE — PROGRESS NOTES
Clinic Care Coordination Contact  UTC/Voicemail    Per ED:   Abdominal pain, nausea and vomiting, mild pancreatitis?  Clear liquids and bland foods, no smoking, etoh, low fat foods (fruit veggies, lean protein, whole grains), increase fluids.     Clinical Data: Care Coordinator Outreach  Outreach attempted x 1.    Left message on voicemail with call back information and requested return call.    Plan:   1. Care Coordinator will mail out care coordination introduction letter with care coordinator contact information and explanation of care coordination services.   2. Care Coordinator will try to reach patient again in 1-2 business days.  WALESKA ReyesN RN Clinic Care Coordinator   [place], [place], [place]  Phone: [phone]

## 2018-09-27 NOTE — DISCHARGE INSTRUCTIONS
Thank you for giving us the opportunity to see you. The impression is that you have a gastrointestinal illness with vomiting and diarrhea.  This resulted in mild inflammation of your pancreas.    Stay with a clear liquid diet, and take Zofran as needed for nausea.  Reserve Vicodin for moderate to severe pain at nighttime.    The following medications were given during your stay: IV fluids, Toradol, Levsin, Dilaudid    Since you received an opiate pain medication or sedative during your visit, please do not drive for at least 8 hours.     If you had lab work, cultures or imaging studies done during your stay, the final results may still be pending. We will call you if your plan of care needs to change.     If you are not seeing an improvement within 2-5 days, please follow up with your primary care provider or clinic.     After discharge, please closely monitor for any new or worsening symptoms. Return to the Emergency Department at any time if your symptoms worsen.

## 2018-09-27 NOTE — LETTER
[facility] CARE COORDINATION  [address] [place] [address]    September 27, 2018    Sudhakar Abdalla  [address]  [facility] [address]      Dear Sudhakar,    I am a clinic care coordinator who works with Delbert Wade MD, MD at [facility]. I wanted to introduce myself and provide you with my contact information so that you can call me with questions or concerns about your health care. Below is a description of clinic care coordination and how I can further assist you.     The clinic care coordinator is a registered nurse and/or  who understand the health care system. The goal of clinic care coordination is to help you manage your health and improve access to the [facility] system in the most efficient manner. The registered nurse can assist you in meeting your health care goals by providing education, coordinating services, and strengthening the communication among your providers. The  can assist you with financial, behavioral, psychosocial, chemical dependency, counseling, and/or psychiatric resources.    Please feel free to contact me at [phone], with any questions or concerns. We at [facility] are focused on providing you with the highest-quality healthcare experience possible and that all starts with you.     Sincerely,     WALESKA ReyesN RN Clinic Care Coordinator   [place], [place], [place]  Phone: [phone]     Enclosed: I have enclosed a copy of a 24 Hour Access Plan. This has helpful phone numbers for you to call when needed. Please keep this in an easy to access place to use as needed.

## 2018-09-27 NOTE — LETTER
Health Care Home - Access Care Plan    About Me  Patient Name:  Sudhakar Abdalla    YOB: 1990  Age:                             28 year old   Bre MRN:            [id number] Telephone Information:     Home Phone [phone]   Mobile Not on file.       Address:    [address] [place] [address]  [place] [address] Email address:  [email or web address]      Emergency Contact(s)  Name Relationship Lgl Grd Work Phone Home Phone Mobile Phone   1. SHEILA ORTIZ Significant ot*   [phone]   2. NOREEN, MATT* Relative   [phone]             Health Maintenance:      My Access Plan  Medical Emergency 911   Questions or concerns during clinic hours Primary Clinic Line, I will call the clinic directly: [facility] [place] - [phone]   24 Hour Appointment Line [phone] or  [phone]) (toll free)   24 Hour Nurse Line [phone] (toll free)   Questions or concerns outside clinic hours 24 Hour Appointment Line, I will call the after-hours on-call line:   [facility] [phone] or [phone]) (toll-free)   Preferred Urgent Care     Preferred Hospital     Preferred Pharmacy [facility] - [place], [place] - [address] [facility]     Behavioral Health Crisis Line The National Suicide Prevention Lifeline at 1-425.846.6555 or 911     My Care Team Members  Patient Care Team       Relationship Specialty Notifications Start End    Delbert Wade MD PCP - General Family Practice  5/16/18     Phone: [phone] Fax: [phone] [address] [place] [address]    Dariana Geiger, RN Clinic Care Coordinator  Admissions 9/27/18     Phone: [phone]                My Medical and Care Information  Problem List   Patient Active Problem List   Diagnosis     History of ADHD     Hx of migraines     Hx of scoliosis     Tobacco use disorder     Chronic bilateral low back pain with bilateral  sciatica     Anger     Premature ejaculation

## 2018-09-27 NOTE — ED AVS SNAPSHOT
[facility] Emergency Department    [address] [place] [address]    Phone:  [phone]    Fax:  [phone]                                       Sudhakar Abdalla   MRN: [id number]    Department:  [facility] Emergency Department   Date of Visit:  9/27/2018           After Visit Summary Signature Page     I have received my discharge instructions, and my questions have been answered. I have discussed any challenges I see with this plan with the nurse or doctor.    ..........................................................................................................................................  Patient/Patient Representative Signature      ..........................................................................................................................................  Patient Representative Print Name and Relationship to Patient    ..................................................               ................................................  Date                                   Time    ..........................................................................................................................................  Reviewed by Signature/Title    ...................................................              ..............................................  Date                                               Time          22EPIC Rev 08/18

## 2018-09-28 NOTE — PROGRESS NOTES
Clinic Care Coordination Contact  [facility]/Voicemail    Clinical Data: Care Coordinator Outreach  Outreach attempted x 2.  Left message on voicemail with call back information and requested return call.    Plan:   1. Care Coordinator mailed out care coordination introduction letter on 9/27/18.   2. Care Coordinator will do no further outreaches at this time.    CASEY Reyes RN Clinic Care Coordinator   [place], [place], [place]  Phone: [phone]

## 2018-10-01 ENCOUNTER — THERAPY VISIT (OUTPATIENT)
Dept: CHIROPRACTIC MEDICINE | Facility: CLINIC | Age: 28
End: 2018-10-01
Payer: MEDICAID

## 2018-10-01 ENCOUNTER — RADIANT APPOINTMENT (OUTPATIENT)
Dept: GENERAL RADIOLOGY | Facility: CLINIC | Age: 28
End: 2018-10-01
Attending: CHIROPRACTOR
Payer: MEDICAID

## 2018-10-01 DIAGNOSIS — M99.02 SEGMENTAL DYSFUNCTION OF THORACIC REGION: ICD-10-CM

## 2018-10-01 DIAGNOSIS — M54.50 LUMBAGO: ICD-10-CM

## 2018-10-01 DIAGNOSIS — M41.9 SCOLIOSIS: ICD-10-CM

## 2018-10-01 DIAGNOSIS — M99.04 SEGMENTAL DYSFUNCTION OF SACRAL REGION: ICD-10-CM

## 2018-10-01 DIAGNOSIS — M99.03 SEGMENTAL DYSFUNCTION OF LUMBAR REGION: ICD-10-CM

## 2018-10-01 DIAGNOSIS — M54.50 LUMBAGO: Primary | ICD-10-CM

## 2018-10-01 PROCEDURE — 72081 X-RAY EXAM ENTIRE SPI 1 VW: CPT | Mod: TC

## 2018-10-01 PROCEDURE — 99203 OFFICE O/P NEW LOW 30 MIN: CPT | Mod: 25 | Performed by: CHIROPRACTOR

## 2018-10-01 PROCEDURE — 98941 CHIROPRACT MANJ 3-4 REGIONS: CPT | Mod: AT | Performed by: CHIROPRACTOR

## 2018-10-01 NOTE — MR AVS SNAPSHOT
After Visit Summary   10/1/2018    Sudhakar Abdalla    MRN: [id number]           Patient Information     Date Of Birth          1990        Visit Information        Provider Department      10/1/2018 9:15 AM Lidia Hancock DC [facility]        Today's Diagnoses     Lumbago    -  1    Segmental dysfunction of thoracic region        Segmental dysfunction of lumbar region        Segmental dysfunction of sacral region        Scoliosis           Follow-ups after your visit        Your next 10 appointments already scheduled     Oct 08, 2018  1:00 PM CDT   BARRETT Chiropractor with Lidia Hancock DC   [facility] ([facility])    [address] [place] [address]   [phone]            Oct 15, 2018  1:00 PM CDT   BARRETT Chiropractor with iLdia Hancock DC   [facility] ([facility])    [address] [place] [address]   [phone]            Oct 18, 2018 10:00 AM CDT   Office Visit with Delbert Wade MD   [facility] ([facility])    [address] [place] [address] [phone]           Bring a current list of meds and any records pertaining to this visit. For Physicals, please bring immunization records and any forms needing to be filled out. Please arrive 10 minutes early to complete paperwork.              Future tests that were ordered for you today     Open Future Orders        Priority Expected Expires Ordered    XR Thoracic Spine 2 Views Routine 10/1/2018 10/1/2019 10/1/2018            Who to contact     If you have questions or need follow up information about today's clinic visit or your schedule please contact [facility] directly at [phone].  Normal or non-critical lab and imaging results will be communicated to you by MyChart, letter or phone within 4 business days after the clinic has received the  results. If you do not hear from us within 7 days, please contact the clinic through [facility] or phone. If you have a critical or abnormal lab result, we will notify you by phone as soon as possible.  Submit refill requests through [facility] or call your pharmacy and they will forward the refill request to us. Please allow 3 business days for your refill to be completed.          Additional Information About Your Visit        [employer]ock[employer] Information     [facility] gives you secure access to your electronic health record. If you see a primary care provider, you can also send messages to your care team and make appointments. If you have questions, please call your primary care clinic.  If you do not have a primary care provider, please call [phone] and they will assist you.        Care EveryWhere ID     This is your Care EveryWhere ID. This could be used by other organizations to access your [facility] medical records  [id number]         Blood Pressure from Last 3 Encounters:   09/27/18 125/86   08/12/18 119/84   06/21/18 110/68    Weight from Last 3 Encounters:   09/27/18 59 kg (130 lb)   06/21/18 54.7 kg (120 lb 9.6 oz)   06/05/17 59.4 kg (130 lb 14.4 oz)              We Performed the Following     CHIROPRAC MANIP,SPINAL,3-4 REGIONS     [facility]        Primary Care Provider Office Phone # Fax #    Delbert Maurice Wade [phone] [address] [place] [address]        Equal Access to Services     [facility]: Hadii aad ku hadasho Soomaali, waaxda luqadaha, qaybta kaalmada timmy, alfonso merida . So [facility] [phone].    ATENCIÓN: Si habla alba, tiene a zamora disposición servicios gratuitos de asistencia lingüística. Llame al [phone].    We comply with applicable federal civil rights laws and [place] laws. We do not discriminate on the basis of race, color, national origin, age, disability, sex, sexual orientation, or gender  identity.            Thank you!     Thank you for choosing [facility]  for your care. Our goal is always to provide you with excellent care. Hearing back from our patients is one way we can continue to improve our services. Please take a few minutes to complete the written survey that you may receive in the mail after your visit with us. Thank you!             Your Updated Medication List - Protect others around you: Learn how to safely use, store and throw away your medicines at www.disposemymeds.org.          This list is accurate as of 10/1/18 10:02 AM.  Always use your most recent med list.                   Brand Name Dispense Instructions for use Diagnosis    EXCEDRIN PO      Take 500 mg by mouth 2 times daily as needed        HYDROcodone-acetaminophen 5-325 MG per tablet    NORCO    6 tablet    Take 1-2 tablets by mouth every 8 hours as needed for severe pain        ibuprofen 200 MG tablet    ADVIL/MOTRIN     Take 3 tablets (600 mg) by mouth every 6 hours as needed for pain or fever        nicotine 10 MG Inhaler    NICOTROL    180 each    Inhale 6-16 Cartridges into the lungs daily as needed for smoking cessation    Tobacco use disorder       ondansetron 4 MG ODT tab    ZOFRAN ODT    8 tablet    Take 1 tablet (4 mg) by mouth every 6 hours as needed for nausea        sertraline 50 MG tablet    ZOLOFT    30 tablet    TAKE ONE TABLET BY MOUTH EVERY DAY    Anger, Premature ejaculation
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
## 2018-10-05 ENCOUNTER — THERAPY VISIT (OUTPATIENT)
Dept: CHIROPRACTIC MEDICINE | Facility: CLINIC | Age: 28
End: 2018-10-05
Payer: MEDICAID

## 2018-10-05 DIAGNOSIS — M99.02 SEGMENTAL DYSFUNCTION OF THORACIC REGION: ICD-10-CM

## 2018-10-05 DIAGNOSIS — M99.04 SEGMENTAL DYSFUNCTION OF SACRAL REGION: ICD-10-CM

## 2018-10-05 DIAGNOSIS — M41.9 SCOLIOSIS: ICD-10-CM

## 2018-10-05 DIAGNOSIS — M99.03 SEGMENTAL DYSFUNCTION OF LUMBAR REGION: ICD-10-CM

## 2018-10-05 DIAGNOSIS — M54.50 LUMBAGO: ICD-10-CM

## 2018-10-05 PROCEDURE — 98941 CHIROPRACT MANJ 3-4 REGIONS: CPT | Mod: AT | Performed by: CHIROPRACTOR

## 2018-10-05 NOTE — MR AVS SNAPSHOT
After Visit Summary   10/5/2018    Sudhakar Abdalla    MRN: [id number]           Patient Information     Date Of Birth          1990        Visit Information        Provider Department      10/5/2018 10:45 AM Lidia Hancock DC [facility]        Today's Diagnoses     Segmental dysfunction of thoracic region        Segmental dysfunction of lumbar region        Lumbago        Scoliosis        Segmental dysfunction of sacral region           Follow-ups after your visit        Your next 10 appointments already scheduled     Oct 08, 2018  1:00 PM CDT   BARRETT Chiropractor with Lidia Hancock DC   [facility] ([facility])    [address] [place] [address]   [phone]            Oct 15, 2018  1:00 PM CDT   BARRETT Chiropractor with Lidia Hancock DC   [facility] ([facility])    [address] [place] [address]   [phone]            Oct 18, 2018 10:00 AM CDT   Office Visit with Delbert Wade MD   [facility]    [address] [place] [address] [phone]           Bring a current list of meds and any records pertaining to this visit. For Physicals, please bring immunization records and any forms needing to be filled out. Please arrive 10 minutes early to complete paperwork.              Who to contact     If you have questions or need follow up information about today's clinic visit or your schedule please contact [facility] directly at [phone].  Normal or non-critical lab and imaging results will be communicated to you by MyChart, letter or phone within 4 business days after the clinic has received the results. If you do not hear from us within 7 days, please contact the clinic through MyChart or phone. If you have a critical or abnormal lab result, we will notify you by phone as soon as  possible.  Submit refill requests through [employer] or call your pharmacy and they will forward the refill request to us. Please allow 3 business days for your refill to be completed.          Additional Information About Your Visit        [email or web address] Information     [employer] gives you secure access to your electronic health record. If you see a primary care provider, you can also send messages to your care team and make appointments. If you have questions, please call your primary care clinic.  If you do not have a primary care provider, please call [phone] and they will assist you.        Care EveryWhere ID     This is your Care EveryWhere ID. This could be used by other organizations to access your [facility] medical records  [id number]         Blood Pressure from Last 3 Encounters:   09/27/18 125/86   08/12/18 119/84   06/21/18 110/68    Weight from Last 3 Encounters:   09/27/18 59 kg (130 lb)   06/21/18 54.7 kg (120 lb 9.6 oz)   06/05/17 59.4 kg (130 lb 14.4 oz)              We Performed the Following     CHIROPRAC MANIP,SPINAL,3-4 REGIONS        Primary Care Provider Office Phone # Fax #    Delbert Wade [phone] [address] [place] [address]        Equal Access to Services     [facility] : Hadii aad ku hadasho Soomaali, waaxda luqadaha, qaybta kaalmada adeegyada, waxay zuleyka phelps. So [facility] [phone].    ATENCIÓN: Si habla español, tiene a zamora disposición servicios gratuitos de asistencia lingüística. Llame al [phone].    We comply with applicable federal civil rights laws and [place] laws. We do not discriminate on the basis of race, color, national origin, age, disability, sex, sexual orientation, or gender identity.            Thank you!     Thank you for choosing [facility]  for your care. Our goal is always to provide you with excellent care. Hearing back from our patients is one way we can continue to  improve our services. Please take a few minutes to complete the written survey that you may receive in the mail after your visit with us. Thank you!             Your Updated Medication List - Protect others around you: Learn how to safely use, store and throw away your medicines at www.disposemymeds.org.          This list is accurate as of 10/5/18 11:01 AM.  Always use your most recent med list.                   Brand Name Dispense Instructions for use Diagnosis    EXCEDRIN PO      Take 500 mg by mouth 2 times daily as needed        HYDROcodone-acetaminophen 5-325 MG per tablet    NORCO    6 tablet    Take 1-2 tablets by mouth every 8 hours as needed for severe pain        ibuprofen 200 MG tablet    ADVIL/MOTRIN     Take 3 tablets (600 mg) by mouth every 6 hours as needed for pain or fever        nicotine 10 MG Inhaler    NICOTROL    180 each    Inhale 6-16 Cartridges into the lungs daily as needed for smoking cessation    Tobacco use disorder       sertraline 50 MG tablet    ZOLOFT    30 tablet    TAKE ONE TABLET BY MOUTH EVERY DAY    Anger, Premature ejaculation

## 2018-10-05 NOTE — PROGRESS NOTES
"Visit #:  2 of 8 based on treatment plan 10/1/2018    Subjective:  Sudhakar Abdalla is a 28 year old male who is seen in f/u up for:        Segmental dysfunction of thoracic region  Segmental dysfunction of lumbar region  Lumbago  Scoliosis  Segmental dysfunction of sacral region.     Since last visit on 10/1/2018,  Sudhakar Abdalla reports the following changes: Patient presents and states that he is the same today, he may have felt better for the rest of the day, but when he woke up the next morning, it was \"the same.\" He is 10 min late for his appt and states that he woke up 10 min ago, so he isn't sure how bad it will be today. Patient rates his pain 5/10 today.            Objective:  The following was observed:    P: palpatory tenderness    A: static palpation demonstrates intersegmental asymmetry     R: motion palpation notes restricted motion    T: muscle spasm at level(s): Lumbar erector spine L>>R      Assessment:    Segmental spinal dysfunction/restrictions found at:  T5  T8  L4  PSIS Left    Diagnoses:      1. Segmental dysfunction of thoracic region    2. Segmental dysfunction of lumbar region    3. Lumbago    4. Scoliosis    5. Segmental dysfunction of sacral region        Patient's condition:  Patient had restrictions pre-manipulation and Patient had decreased motion prior to manipulation    Treatment effectiveness:  Post manipulation there is better intersegmental movement and Patient claims to feel looser post manipulation      Procedures:  CMT:  55940 Chiropractic manipulative treatment 3-4 regions performed   Thoracic: Activator, T5, T9, Prone  Lumbar: Drop Table, L4, Prone  Pelvis: Drop Table, PSIS Left , Prone    Modalities:  53138: MSTM:  To Levator scapulae  for 5 min    Therapeutic procedures:  Gave patient Ice instructions post adjustment, and instructions for acute care      Prognosis: Good    Progress towards Goals:   Decrease pain in thoracic and lumbar spine as it relates to " "scoliosis.  Reduce effects of scoliosis on MSK system.        Response to Treatment:   Reduction of symptoms with first treatment.       Recommendations:    Instructions:ice 20 minutes every other hour as needed and stretch as instructed at visit    Follow-up:  Return to care next week. When patient leaves, he states, \"I actually don't hurt right now!\"               "

## 2018-10-08 ENCOUNTER — THERAPY VISIT (OUTPATIENT)
Dept: CHIROPRACTIC MEDICINE | Facility: CLINIC | Age: 28
End: 2018-10-08
Payer: MEDICAID

## 2018-10-08 DIAGNOSIS — M41.9 SCOLIOSIS: ICD-10-CM

## 2018-10-08 DIAGNOSIS — M99.02 SEGMENTAL DYSFUNCTION OF THORACIC REGION: ICD-10-CM

## 2018-10-08 DIAGNOSIS — M99.04 SEGMENTAL DYSFUNCTION OF SACRAL REGION: ICD-10-CM

## 2018-10-08 DIAGNOSIS — M54.50 LUMBAGO: ICD-10-CM

## 2018-10-08 DIAGNOSIS — M99.03 SEGMENTAL DYSFUNCTION OF LUMBAR REGION: ICD-10-CM

## 2018-10-08 PROCEDURE — 98941 CHIROPRACT MANJ 3-4 REGIONS: CPT | Mod: AT | Performed by: CHIROPRACTOR

## 2018-10-08 NOTE — PROGRESS NOTES
Visit #:  3 of 8 based on treatment plan 10/1/2018    Subjective:  Sudhakar Abdalla is a 28 year old male who is seen in f/u up for:        Segmental dysfunction of thoracic region  Segmental dysfunction of lumbar region  Lumbago  Scoliosis  Segmental dysfunction of sacral region.     Since last visit on 10/5/2018,  Sudhakar Abdalla reports the following changes: Patient presents and states that he is the same today, worked a lot of hours the last 2 days. He walks at least 5 miles per day, while at work. He feels better when he leaves here, then when he works, it worsens. He is not going in today. He rates his current pain 5/10, but just got out of bed 20 min ago.          Objective:  The following was observed:    P: palpatory tenderness    A: static palpation demonstrates intersegmental asymmetry     R: motion palpation notes restricted motion    T: muscle spasm at level(s): Lumbar erector spine L>>R      Assessment:    Segmental spinal dysfunction/restrictions found at:  T5 RR, LRR  T9 E, FR  L4 RR,  LRR  Left SI posterior, restricted P to A    Diagnoses:      1. Segmental dysfunction of thoracic region    2. Segmental dysfunction of lumbar region    3. Lumbago    4. Scoliosis    5. Segmental dysfunction of sacral region        Patient's condition:  Patient had restrictions pre-manipulation and Patient had decreased motion prior to manipulation    Treatment effectiveness:  Post manipulation there is better intersegmental movement and Patient claims to feel looser post manipulation      Procedures:  CMT:  10927 Chiropractic manipulative treatment 3-4 regions performed   Thoracic: Activator, T5, T9, Prone  Lumbar: Drop Table, L4, Prone  Pelvis: Drop Table, PSIS Left , Prone    Modalities:  72776: MSTM:  To Levator scapulae  for 5 min    Therapeutic procedures:  Gave patient Ice instructions post adjustment, and instructions for acute care      Prognosis: Good    Progress towards Goals:   Decrease pain in thoracic and  lumbar spine as it relates to scoliosis.  Reduce effects of scoliosis on MSK system.        Response to Treatment:   Reduction of symptoms with care, but then going to work exacerbates his pain.       Recommendations:    Instructions:ice 20 minutes every other hour as needed and stretch as instructed at visit    Follow-up:  Return to care next week.

## 2018-10-08 NOTE — MR AVS SNAPSHOT
After Visit Summary   10/8/2018    Sudhakar Abdalla    MRN: [id number]           Patient Information     Date Of Birth          1990        Visit Information        Provider Department      10/8/2018 1:00 PM Lidia Hancock DC [facility]        Today's Diagnoses     Segmental dysfunction of thoracic region        Segmental dysfunction of lumbar region        Lumbago        Scoliosis        Segmental dysfunction of sacral region           Follow-ups after your visit        Your next 10 appointments already scheduled     Oct 15, 2018  1:00 PM CDT   [facility] Chiropractor with Lidia Hancock DC   [facility] ([facility] [place])    [address] [facility] [facility] [id number] [phone]            Oct 18, 2018 10:00 AM CDT   [facility]    [id number] [place] [address] [phone]           Bring a current list of meds and any records pertaining to this visit. For Physicals, please bring immunization records and any forms needing to be filled out. Please arrive 10 minutes early to complete paperwork.              Who to contact     If you have questions or need follow up information about today's clinic visit or your schedule please contact [facility] directly at [phone].  Normal or non-critical lab and imaging results will be communicated to you by MyChart, letter or phone within 4 business days after the clinic has received the results. If you do not hear from us within 7 days, please contact the clinic through MyChart or phone. If you have a critical or abnormal lab result, we will notify you by phone as soon as possible.  Submit refill requests through XMPie or call your pharmacy and they will forward the refill request to us. Please allow 3 business days for your refill to be completed.          Additional  Information About Your Visit        Timescapehart Information     [employer] gives you secure access to your electronic health record. If you see a primary care provider, you can also send messages to your care team and make appointments. If you have questions, please call your primary care clinic.  If you do not have a primary care provider, please call [phone] and they will assist you.        Care EveryWhere ID     This is your Care EveryWhere ID. This could be used by other organizations to access your [place] medical records  [id number]         Blood Pressure from Last 3 Encounters:   09/27/18 125/86   08/12/18 119/84   06/21/18 110/68    Weight from Last 3 Encounters:   09/27/18 59 kg (130 lb)   06/21/18 54.7 kg (120 lb 9.6 oz)   06/05/17 59.4 kg (130 lb 14.4 oz)              We Performed the Following     CHIROPRAC MANIP,SPINAL,3-4 REGIONS        Primary Care Provider Office Phone # Fax #    Delbert Wade [phone] [address] [place] [address]        Equal Access to Services     [facility]: Hadii aad ku hadasho Soomaali, waaxda luqadaha, qaybta kaalmada timmy, alfonso merida . So [facility] [phone].    ATENCIÓN: Si habla español, tiene a zamora disposición servicios gratuitos de asistencia lingüística. Clarence al [phone].    We comply with applicable federal civil rights laws and [place] laws. We do not discriminate on the basis of race, color, national origin, age, disability, sex, sexual orientation, or gender identity.            Thank you!     Thank you for choosing [facility]  for your care. Our goal is always to provide you with excellent care. Hearing back from our patients is one way we can continue to improve our services. Please take a few minutes to complete the written survey that you may receive in the mail after your visit with us. Thank you!             Your Updated Medication List - Protect others  around you: Learn how to safely use, store and throw away your medicines at www.disposemymeds.org.          This list is accurate as of 10/8/18  1:12 PM.  Always use your most recent med list.                   Brand Name Dispense Instructions for use Diagnosis    EXCEDRIN PO      Take 500 mg by mouth 2 times daily as needed        HYDROcodone-acetaminophen 5-325 MG per tablet    NORCO    6 tablet    Take 1-2 tablets by mouth every 8 hours as needed for severe pain        ibuprofen 200 MG tablet    ADVIL/MOTRIN     Take 3 tablets (600 mg) by mouth every 6 hours as needed for pain or fever        nicotine 10 MG Inhaler    NICOTROL    180 each    Inhale 6-16 Cartridges into the lungs daily as needed for smoking cessation    Tobacco use disorder       sertraline 50 MG tablet    ZOLOFT    30 tablet    TAKE ONE TABLET BY MOUTH EVERY DAY    Anger, Premature ejaculation

## 2018-10-15 ENCOUNTER — OFFICE VISIT (OUTPATIENT)
Dept: FAMILY MEDICINE | Facility: OTHER | Age: 28
End: 2018-10-15
Payer: MEDICAID

## 2018-10-15 ENCOUNTER — TELEPHONE (OUTPATIENT)
Dept: FAMILY MEDICINE | Facility: OTHER | Age: 28
End: 2018-10-15

## 2018-10-15 ENCOUNTER — THERAPY VISIT (OUTPATIENT)
Dept: CHIROPRACTIC MEDICINE | Facility: CLINIC | Age: 28
End: 2018-10-15
Payer: MEDICAID

## 2018-10-15 VITALS
TEMPERATURE: 97.4 F | RESPIRATION RATE: 16 BRPM | WEIGHT: 125.8 LBS | BODY MASS INDEX: 21.27 KG/M2 | DIASTOLIC BLOOD PRESSURE: 66 MMHG | HEART RATE: 72 BPM | SYSTOLIC BLOOD PRESSURE: 106 MMHG

## 2018-10-15 DIAGNOSIS — R10.31 ABDOMINAL PAIN, RIGHT LOWER QUADRANT: ICD-10-CM

## 2018-10-15 DIAGNOSIS — R45.4 ANGER: ICD-10-CM

## 2018-10-15 DIAGNOSIS — M54.50 CHRONIC LEFT-SIDED LOW BACK PAIN WITHOUT SCIATICA: ICD-10-CM

## 2018-10-15 DIAGNOSIS — G47.34 NOCTURNAL HYPOXIA: ICD-10-CM

## 2018-10-15 DIAGNOSIS — M99.02 SEGMENTAL DYSFUNCTION OF THORACIC REGION: ICD-10-CM

## 2018-10-15 DIAGNOSIS — F32.4 MAJOR DEPRESSIVE DISORDER WITH SINGLE EPISODE, IN PARTIAL REMISSION (H): Primary | ICD-10-CM

## 2018-10-15 DIAGNOSIS — M99.04 SEGMENTAL DYSFUNCTION OF SACRAL REGION: ICD-10-CM

## 2018-10-15 DIAGNOSIS — G47.00 INSOMNIA, UNSPECIFIED TYPE: ICD-10-CM

## 2018-10-15 DIAGNOSIS — G89.29 CHRONIC LEFT-SIDED LOW BACK PAIN WITHOUT SCIATICA: ICD-10-CM

## 2018-10-15 DIAGNOSIS — R19.5 LOOSE STOOLS: ICD-10-CM

## 2018-10-15 DIAGNOSIS — F52.4 PREMATURE EJACULATION: ICD-10-CM

## 2018-10-15 DIAGNOSIS — M99.03 SEGMENTAL DYSFUNCTION OF LUMBAR REGION: ICD-10-CM

## 2018-10-15 DIAGNOSIS — M41.9 SCOLIOSIS: ICD-10-CM

## 2018-10-15 DIAGNOSIS — R10.13 ABDOMINAL PAIN, EPIGASTRIC: ICD-10-CM

## 2018-10-15 DIAGNOSIS — M54.50 LUMBAGO: ICD-10-CM

## 2018-10-15 PROCEDURE — 98941 CHIROPRACT MANJ 3-4 REGIONS: CPT | Mod: AT | Performed by: CHIROPRACTOR

## 2018-10-15 PROCEDURE — 99214 OFFICE O/P EST MOD 30 MIN: CPT | Performed by: FAMILY MEDICINE

## 2018-10-15 RX ORDER — SERTRALINE HYDROCHLORIDE 100 MG/1
100 TABLET, FILM COATED ORAL DAILY
Qty: 90 TABLET | Refills: 1 | Status: SHIPPED | OUTPATIENT
Start: 2018-10-15 | End: 2018-10-15

## 2018-10-15 RX ORDER — SERTRALINE HYDROCHLORIDE 100 MG/1
100 TABLET, FILM COATED ORAL DAILY
Qty: 90 TABLET | Refills: 1 | Status: SHIPPED | OUTPATIENT
Start: 2018-10-15 | End: 2019-04-18

## 2018-10-15 RX ORDER — CYCLOBENZAPRINE HCL 10 MG
10 TABLET ORAL 3 TIMES DAILY PRN
Qty: 90 TABLET | Refills: 1 | Status: SHIPPED | OUTPATIENT
Start: 2018-10-15 | End: 2019-10-17

## 2018-10-15 ASSESSMENT — ANXIETY QUESTIONNAIRES
5. BEING SO RESTLESS THAT IT IS HARD TO SIT STILL: SEVERAL DAYS
6. BECOMING EASILY ANNOYED OR IRRITABLE: SEVERAL DAYS
7. FEELING AFRAID AS IF SOMETHING AWFUL MIGHT HAPPEN: NOT AT ALL
1. FEELING NERVOUS, ANXIOUS, OR ON EDGE: SEVERAL DAYS
2. NOT BEING ABLE TO STOP OR CONTROL WORRYING: NOT AT ALL
GAD7 TOTAL SCORE: 7
IF YOU CHECKED OFF ANY PROBLEMS ON THIS QUESTIONNAIRE, HOW DIFFICULT HAVE THESE PROBLEMS MADE IT FOR YOU TO DO YOUR WORK, TAKE CARE OF THINGS AT HOME, OR GET ALONG WITH OTHER PEOPLE: SOMEWHAT DIFFICULT
3. WORRYING TOO MUCH ABOUT DIFFERENT THINGS: SEVERAL DAYS

## 2018-10-15 ASSESSMENT — PATIENT HEALTH QUESTIONNAIRE - PHQ9: 5. POOR APPETITE OR OVEREATING: NEARLY EVERY DAY

## 2018-10-15 ASSESSMENT — PAIN SCALES - GENERAL: PAINLEVEL: SEVERE PAIN (6)

## 2018-10-15 NOTE — TELEPHONE ENCOUNTER
Reason for Call:  Same Day Appointment, Requested Provider:  Delbert Wade MD    PCP: Delbert Wade    Reason for visit: Follow-up Zoloft    Duration of symptoms:     Have you been treated for this in the past? Yes    Additional comments: patient couldn't make his appt today and is wondering if Dr Wade would work him in asap. Declined another provider    Can we leave a detailed message on this number? YES    Phone number patient can be reached at: Home number on file [phone] (home)    Best Time: any    Call taken on 10/15/2018 at 12:06 PM by Gabriela Olson

## 2018-10-15 NOTE — PROGRESS NOTES
Visit #:  4 of 8 based on treatment plan 10/1/2018    Subjective:  Sudhakar Abdalla is a 28 year old male who is seen in f/u up for:        Segmental dysfunction of thoracic region  Segmental dysfunction of lumbar region  Lumbago  Scoliosis  Segmental dysfunction of sacral region.     Since last visit on 10/8/2018,  Sudhakar Abdalla reports the following changes: Patient presents and states that he is really sore today. He worked all weekend, as he does every weekend. He rates his pain 6/10 today, and states that it is worse than usual. Patient was 10 minutes late for 15 minute appt, so care will be shortened today.        Objective:  The following was observed:    P: palpatory tenderness left lower ribs posteriorly    A: static palpation demonstrates intersegmental asymmetry     R: motion palpation notes restricted motion    T: muscle spasm at level(s): Lumbar erector spine L>>R      Assessment:    Segmental spinal dysfunction/restrictions found at:  T5 RR, LRR  T9 LR, RRR  L4 LR, RRR  Right SI posterior, restricted P to A    Diagnoses:      1. Segmental dysfunction of thoracic region    2. Segmental dysfunction of lumbar region    3. Lumbago    4. Scoliosis    5. Segmental dysfunction of sacral region        Patient's condition:  Patient had restrictions pre-manipulation and Patient had decreased motion prior to manipulation    Treatment effectiveness:  Post manipulation there is better intersegmental movement and Patient claims to feel looser post manipulation      Procedures:  CMT:  16416 Chiropractic manipulative treatment 3-4 regions performed   Thoracic: Activator, T5, T9, Prone  Lumbar: Drop Table, L4, Prone  Pelvis: Drop Table, Right SI, Prone   Activator to left lower ribs.    Modalities:  00351: MSTM:  To Levator scapulae  for 5 min    Therapeutic procedures:  Gave patient Ice instructions post adjustment, and instructions for acute care      Prognosis: Good    Progress towards Goals:   Decrease pain in  thoracic and lumbar spine as it relates to scoliosis.  Reduce effects of scoliosis on MSK system.        Response to Treatment:   Reduction of symptoms with care, but then going to work exacerbates his pain. Patient felt he was more exacerbated from extra hours over the weekend.       Recommendations:    Instructions:ice 20 minutes every other hour as needed and stretch as instructed at visit    Follow-up:  Return to care next week.

## 2018-10-15 NOTE — MR AVS SNAPSHOT
After Visit Summary   10/15/2018    Sudhakar Abdalla    MRN: [id number]           Patient Information     Date Of Birth          1990        Visit Information        Provider Department      10/15/2018 2:00 PM Delbert Wade MD [facility]        Today's Diagnoses     Major depressive disorder with single episode, in partial remission (H)    -  1    Abdominal pain, right lower quadrant        Anger        Abdominal pain, epigastric        Loose stools        Chronic left-sided low back pain without sciatica        Insomnia, unspecified type        Nocturnal hypoxia        Premature ejaculation          Care Instructions    Thank you for visiting [facility]    Try ranitidine to help with your symptoms.    We'll let you know your lab results as soon as we can.     If not improving, we should consider CT or colonoscopy.    See the sleep specialist.      Increase your sertraline to help with mood.  Let me know if any worsening of symptoms with the increased dose.      Can try some muscle relaxants for your back if desired.    Please Follow Up when indicated on the section below this one on your After-Visit Summary.      If you had imaging scheduled please refer to your radiology prep sheet.    Appointment    Date_______________     Time_____________    Day:   M TU W TH F    With____________________________    Location_________________________    If you need medication refills, please contact your pharmacy 3 days before your prescriptions runs out. If you are out of refills, your pharmacy will contact contact the clinic.    Contact us or return if questions or concerns.     -Your Care Team:  MD Kaitlin Hsu PA-C Joel De Haan, PA-C Elizabeth McLean, APRN CNP    General information about your clinic      Clinic hours:     Lab hours:  Phone [phone]  Monday 7:30 am-7 pm    Monday 8:30 am-6:30 pm  Tuesday-Friday 7:30 am-5  pm   Tuesday-Friday 8:30 am-4:30 pm    Pharmacy hours:  Phone [phone]  Monday 8:30 am-7pm  Tuesday-Friday 8:30am-6 pm                                       Harika norman [phone]        We would like to hear from you, how was your visit today?    Madisyn Powers  Patient Information Supervisor   Patient Care Supervisor  [place] [place], and [facility], and [facility]  [phone]             Follow-ups after your visit        Additional Services     SLEEP EVALUATION & MANAGEMENT REFERRAL - ADULT -[facility] [phone] (Age 13 if over 100 lbs)       Please be aware that coverage of these services is subject to the terms and limitations of your health insurance plan.  Call member services at your health plan with any benefit or coverage questions.      Please bring the following to your appointment:    >>   List of current medications   >>   This referral request   >>   Any documents/labs given to you for this referral                      Follow-up notes from your care team     Return in about 4 weeks (around 11/12/2018) for Recheck.      Your next 10 appointments already scheduled     Oct 22, 2018  1:00 PM CDT   [facility] Chiropractor with Lidia Hancock DC   [facility]    [address] [facility] [address] [phone]            Nov 12, 2018  2:15 PM CST   Office Visit with Delbert Wade MD   [facility]    [id number] [address] [phone]           Bring a current list of meds and any records pertaining to this visit. For Physicals, please bring immunization records and any forms needing to be filled out. Please arrive 10 minutes early to complete paperwork.              Future tests that were ordered for you today     Open Future Orders        Priority Expected  Expires Ordered    Giardia antigen Routine  10/15/2019 10/15/2018    Clostridium difficile Toxin B PCR Routine  11/14/2018 10/15/2018    Ova and Parasite Exam Routine Routine  10/15/2019 10/15/2018    SLEEP EVALUATION & MANAGEMENT REFERRAL - ADULT -[facility] [phone] (Age 13 if over 100 lbs) Routine  10/15/2019 10/15/2018            Who to contact     If you have questions or need follow up information about today's clinic visit or your schedule please contact [facility] [place] directly at [phone].  Normal or non-critical lab and imaging results will be communicated to you by Voovio aka 3Ditizehart, letter or phone within 4 business days after the clinic has received the results. If you do not hear from us within 7 days, please contact the clinic through Geneluxt or phone. If you have a critical or abnormal lab result, we will notify you by phone as soon as possible.  Submit refill requests through Maginatics or call your pharmacy and they will forward the refill request to us. Please allow 3 business days for your refill to be completed.          Additional Information About Your Visit        Voovio aka 3Ditizehart Information     Maginatics gives you secure access to your electronic health record. If you see a primary care provider, you can also send messages to your care team and make appointments. If you have questions, please call your primary care clinic.  If you do not have a primary care provider, please call [phone] and they will assist you.        Care EveryWhere ID     This is your Care EveryWhere ID. This could be used by other organizations to access your [place] medical records  [id number]        Your Vitals Were     Pulse Temperature Respirations BMI (Body Mass Index)          72 97.4  F (36.3  C) (Temporal) 16 21.27 kg/m2         Blood Pressure from Last 3 Encounters:   10/15/18 106/66   09/27/18 125/86   08/12/18 119/84    Weight from Last 3 Encounters:   10/15/18 125 lb 12.8 oz (57.1  kg)   09/27/18 130 lb (59 kg)   06/21/18 120 lb 9.6 oz (54.7 kg)                 Today's Medication Changes          These changes are accurate as of 10/15/18  2:27 PM.  If you have any questions, ask your nurse or doctor.               Start taking these medicines.        Dose/Directions    cyclobenzaprine 10 MG tablet   Commonly known as:  FLEXERIL   Used for:  Chronic left-sided low back pain without sciatica   Started by:  Delbert Wade MD        Dose:  10 mg   Take 1 tablet (10 mg) by mouth 3 times daily as needed for muscle spasms   Quantity:  90 tablet   Refills:  1       ranitidine 150 MG tablet   Commonly known as:  ZANTAC   Used for:  Abdominal pain, epigastric   Started by:  Delbert Wade MD        Dose:  150 mg   Take 1 tablet (150 mg) by mouth 2 times daily   Quantity:  60 tablet   Refills:  1       sertraline 100 MG tablet   Commonly known as:  ZOLOFT   Used for:  Anger, Premature ejaculation   Started by:  Delbert Wade MD        Dose:  100 mg   Take 1 tablet (100 mg) by mouth daily   Quantity:  90 tablet   Refills:  1            Where to get your medicines      These medications were sent to [facility] - [place], [place] - [address]  [address] [address]     Phone:  [phone]     cyclobenzaprine 10 MG tablet    ranitidine 150 MG tablet    sertraline 100 MG tablet                Primary Care Provider Office Phone # Fax #    Delbert Wade [phone] [address] [place] [address]        Equal Access to Services     [facility]: Hadii aad ku hadasho Soomaali, waaxda luqadaha, qaybta kaalmada adeegyada, waxay yuliin haymonalisa phelps. So [facility] [phone].    ATENCIÓN: Si habla español, tiene a zamora disposición servicios gratuitos de asistencia lingüística. Llame al [phone].    We comply with applicable federal civil rights laws and [place] laws. We do not discriminate on  the basis of race, color, national origin, age, disability, sex, sexual orientation, or gender identity.            Thank you!     Thank you for choosing [facility]  for your care. Our goal is always to provide you with excellent care. Hearing back from our patients is one way we can continue to improve our services. Please take a few minutes to complete the written survey that you may receive in the mail after your visit with us. Thank you!             Your Updated Medication List - Protect others around you: Learn how to safely use, store and throw away your medicines at www.disposemymeds.org.          This list is accurate as of 10/15/18  2:27 PM.  Always use your most recent med list.                   Brand Name Dispense Instructions for use Diagnosis    cyclobenzaprine 10 MG tablet    FLEXERIL    90 tablet    Take 1 tablet (10 mg) by mouth 3 times daily as needed for muscle spasms    Chronic left-sided low back pain without sciatica       EXCEDRIN PO      Take 500 mg by mouth 2 times daily as needed        ibuprofen 200 MG tablet    ADVIL/MOTRIN     Take 3 tablets (600 mg) by mouth every 6 hours as needed for pain or fever        ranitidine 150 MG tablet    ZANTAC    60 tablet    Take 1 tablet (150 mg) by mouth 2 times daily    Abdominal pain, epigastric       sertraline 100 MG tablet    ZOLOFT    90 tablet    Take 1 tablet (100 mg) by mouth daily    Anger, Premature ejaculation

## 2018-10-15 NOTE — PATIENT INSTRUCTIONS
Thank you for visiting [facility]    Try ranitidine to help with your symptoms.    We'll let you know your lab results as soon as we can.     If not improving, we should consider CT or colonoscopy.    See the sleep specialist.      Increase your sertraline to help with mood.  Let me know if any worsening of symptoms with the increased dose.      Can try some muscle relaxants for your back if desired.    Please Follow Up when indicated on the section below this one on your After-Visit Summary.      If you had imaging scheduled please refer to your radiology prep sheet.    Appointment    Date_______________     Time_____________    Day:   M TU W TH F    With____________________________    Location_________________________    If you need medication refills, please contact your pharmacy 3 days before your prescriptions runs out. If you are out of refills, your pharmacy will contact contact the clinic.    Contact us or return if questions or concerns.     -Your Care Team:  MD Kaitlin Hsu PA-C Joel De Haan, PA-C Elizabeth McLean, APRN CNP    General information about your clinic      Clinic hours:     Lab hours:  Phone [phone]  Monday 7:30 am-7 pm    Monday 8:30 am-6:30 pm  Tuesday-Friday 7:30 am-5 pm   Tuesday-Friday 8:30 am-4:30 pm    Pharmacy hours:  Phone [phone]  Monday 8:30 am-7pm  Tuesday-Friday 8:30am-6 pm                                       Mychart assistance [phone]        We would like to hear from you, how was your visit today?    Madisyn Powers  Patient Information Supervisor   Patient Care Supervisor  [facility] [place], and [facility], and [facility]  [phone]

## 2018-10-15 NOTE — PROGRESS NOTES
SUBJECTIVE:   Sudhakar Abdalla is a 28 year old male who presents to clinic today for the following health issues:      HPI  ED/UC Followup:    Facility:  [facility]  Date of visit: 09/27/18  Reason for visit: abdominal pain  Current Status: Pancreatitis isn't getting better-slightly worse     Had n/v/d several weeks ago.  Felt to possibly be pancreatitis.      He will have random sharp pains lasting a few minutes.  Also some pains if he bends over wrong.  Appetite is reduced.  He is stooling 3-4 times/day.  Sometimes with urgency, but only one episode of incontinence of stool at this point (this was a few days before his ER visit).  Denies recent antibiotics.  No significant travel.  No contacts with similar symptoms except wife with symptoms that started during pregnancy.  Rare vomiting.  Some mild nausea, related to smells around him.      Chronic Pain Follow-Up       Type / Location of Pain: low back, some thoracic  Analgesia/pain control:       Recent changes:  Worse just got done with chiro about an hour ago      Overall control: Inadequate pain control  Activity level/function:      Daily activities:  Able to do moderate activities    Work:  Full time without limitations  Adverse effects:  Yes headaches  Adherance    Taking medication as directed?  Yes    Participating in other treatments: no -   Risk Factors:    Sleep:  Poor    Mood/anxiety:  Improved slightly not as many bad day    Recent family or social stressors:  none noted    Other aggravating factors: prolonged sitting and prolonged standing walking  PHQ-9 SCORE 6/21/2018   Total Score MyChart 9 (Mild depression)   Total Score 9     CARIDAD-7 SCORE 6/21/2018   Total Score 3 (minimal anxiety)   Total Score 3     Encounter-Level CSA:     There are no encounter-level csa.           Pt's mood has been better since taking sertraline.  Continue to have a difficult time sleeping.  He reports his anger is much better, many fewer bad days than before.       Denies any side effects from this.      Still smoking a fair bit, didn't really use the nicotrol.      Problem list and histories reviewed & adjusted, as indicated.  Additional history: as documented      Current Outpatient Prescriptions   Medication Sig Dispense Refill     Aspirin-Acetaminophen-Caffeine (EXCEDRIN PO) Take 500 mg by mouth 2 times daily as needed       ibuprofen (ADVIL/MOTRIN) 200 MG tablet Take 3 tablets (600 mg) by mouth every 6 hours as needed for pain or fever       nicotine (NICOTROL) 10 MG Inhaler Inhale 6-16 Cartridges into the lungs daily as needed for smoking cessation 180 each 1     sertraline (ZOLOFT) 50 MG tablet TAKE ONE TABLET BY MOUTH EVERY DAY 30 tablet 0     Recent Labs   Lab Test  09/27/18   0048  06/21/18   1505  06/01/16   0848   LDL   --    --   122*   HDL   --    --   36*   TRIG   --    --   79   ALT  28  23   --    CR  0.82  0.86   --    GFRESTIMATED  >90  >90   --    GFRESTBLACK  >90  >90   --    POTASSIUM  3.9  5.0   --    TSH   --   0.94  1.64      BP Readings from Last 3 Encounters:   10/15/18 106/66   09/27/18 125/86   08/12/18 119/84    Wt Readings from Last 3 Encounters:   10/15/18 125 lb 12.8 oz (57.1 kg)   09/27/18 130 lb (59 kg)   06/21/18 120 lb 9.6 oz (54.7 kg)                  ROS:  Constitutional, HEENT, cardiovascular, pulmonary, gi and gu systems are negative, except as otherwise noted.    OBJECTIVE:     /66 (BP Location: Right arm, Patient Position: Chair, Cuff Size: Adult Regular)  Pulse 72  Temp 97.4  F (36.3  C) (Temporal)  Resp 16  Wt 125 lb 12.8 oz (57.1 kg)  BMI 21.27 kg/m2  Body mass index is 21.27 kg/(m^2).  GENERAL: healthy, alert and no distress  NECK: no adenopathy, no asymmetry, masses, or scars and thyroid normal to palpation  RESP: lungs clear to auscultation - no rales, rhonchi or wheezes  CV: regular rate and rhythm, normal S1 S2, no S3 or S4, no murmur, click or rub, no peripheral edema and peripheral pulses strong  ABDOMEN:  epigastric and low abdominal tenderness, increased abdominal sounds, no masses  MS: left low back tenderness, greater than right.    Diagnostic Test Results:  Results for orders placed or performed in visit on 10/01/18   XR Thor/Lumb Standing 1 View (Scoli)    Narrative    THORACIC/LUMBAR STANDING ONE VIEW (SCOLIOSIS)   10/1/2018 10:14 AM     HISTORY: Surgery for scoliosis in 1999, has not had imaging since, and  worsening low back pain. Lumbago.    COMPARISON: Abdominal x-rays dated 9/27/2018. No comparison studies  from 1999 are available at the time of this dictation.     FINDINGS: There is a very mild levoconvex curvature of the  thoracolumbar spine centered at approximately T12/L1 measuring  approximately 11 degrees from the inferior endplate of T9 through the  inferior endplate of L4. Minimal dextroconvex curvature of the mid  thoracic spine measures approximately 13 degrees from the superior  endplate of T3 through the inferior endplate of T8. The rotatory  component of this curvature makes the measurements somewhat difficult.    There is irregularity of the superolateral right chest wall which  could be posttraumatic/postsurgical or congenital in etiology.  Recommend clinical correlation. Cerclage wires seen along the right  side of the manubrium. Lungs are grossly clear. Heart size is within  normal limits. No pneumothorax, significant pleural fluid collection  or pulmonary edema is seen. No obvious fracture. Bowel gas pattern is  within normal limits.      Impression    IMPRESSION:  1. Mild S-shaped curvature of the thoracolumbar spine with  dextroconvex portion of the upper thoracic spine measuring  approximately 13 degrees and with levoconvex broad-based thoracolumbar  curvature measuring approximately 11 degrees.  2. Postop changes right upper chest as described.      ANGELA SUE MD       ASSESSMENT/PLAN:     Tobacco Cessation:   reports that he has been smoking Cigarettes.  He started smoking about  14 years ago. He has a 12.00 pack-year smoking history. He has quit using smokeless tobacco.  Tobacco Cessation Action Plan: Information offered: Patient not interested at this time        ICD-10-CM    1. Major depressive disorder with single episode, in partial remission (H) F32.4    2. Abdominal pain, right lower quadrant R10.31 Giardia antigen     Clostridium difficile Toxin B PCR     Ova and Parasite Exam Routine   3. Anger R45.4 sertraline (ZOLOFT) 100 MG tablet     DISCONTINUED: sertraline (ZOLOFT) 100 MG tablet   4. Abdominal pain, epigastric R10.13 Giardia antigen     Clostridium difficile Toxin B PCR     Ova and Parasite Exam Routine     ranitidine (ZANTAC) 150 MG tablet     DISCONTINUED: ranitidine (ZANTAC) 150 MG tablet   5. Loose stools R19.5 Giardia antigen     Clostridium difficile Toxin B PCR     Ova and Parasite Exam Routine   6. Chronic left-sided low back pain without sciatica M54.5 cyclobenzaprine (FLEXERIL) 10 MG tablet    G89.29    7. Insomnia, unspecified type G47.00 SLEEP EVALUATION & MANAGEMENT REFERRAL - [facility] [phone] (Age 15 if over 100 lbs)   8. Nocturnal hypoxia G47.34 SLEEP EVALUATION & MANAGEMENT REFERRAL - [facility] [phone] (Age 15 if over 100 lbs)   9. Premature ejaculation F52.4 sertraline (ZOLOFT) 100 MG tablet     DISCONTINUED: sertraline (ZOLOFT) 100 MG tablet     1, 3.  Patient reports improvement, but his PHQ 9 is not significantly improved.  It is actually slightly worse.  Discussed increasing his sertraline dose.  Patient was amenable to this.  Follow-up in 1 month.  2, 4, 5.  Unclear etiology.  I do not think that this is pancreatitis.  Will do a trial of acid blockers to help rule out GERD.  Given the duration of symptoms and his wife similar symptoms, will also do some stool tests to look for parasitic or other infectious etiologies.  If these are all negative, consider colonoscopy or CT scan to  further evaluate.  Otherwise continue with symptom control.  Also discussed possibility that his SSRI could be contributing to his symptoms.  If his symptoms worsen with the increase noted above, then will have to find a different treatment regimen for his mood.  Follow-up in 1 month.  6.  Fair control with chiropractic.  He is interested in a trial of muscle relaxants as he does have periodic spasms.  Prescription was given for this.  7, 8.  Patient reports hypoxia while he was at the ER and fell asleep.  He also reports very poor sleep.  Because of this we will refer for sleep evaluation prior to simply trying something to help with insomnia.  Encouraged good sleep hygiene.  9.  Had improved slightly with sertraline.  Will see how it does with increased dose.  Follow-up in 1 month.    Portions of this note were completed using Dragon dictation software.  Although reviewed, there may be typographical and other inadvertent errors that remain.             Patient Instructions   Thank you for visiting [facility]    Try ranitidine to help with your symptoms.    We'll let you know your lab results as soon as we can.     If not improving, we should consider CT or colonoscopy.    See the sleep specialist.      Increase your sertraline to help with mood.  Let me know if any worsening of symptoms with the increased dose.      Can try some muscle relaxants for your back if desired.    Please Follow Up when indicated on the section below this one on your After-Visit Summary.      If you had imaging scheduled please refer to your radiology prep sheet.    Appointment    Date_______________     Time_____________    Day:   M TU W TH F    With____________________________    Location_________________________    If you need medication refills, please contact your pharmacy 3 days before your prescriptions runs out. If you are out of refills, your pharmacy will contact contact the clinic.    Contact us or return  if questions or concerns.     -Your Care Team:  MD Kaitlin Hsu PA-C Joel De Haan, PA-C Elizabeth McLean, APRN CNP    General information about your clinic      Clinic hours:     Lab hours:  Phone [phone]  Monday 7:30 am-7 pm    Monday 8:30 am-6:30 pm  Tuesday-Friday 7:30 am-5 pm   Tuesday-Friday 8:30 am-4:30 pm    Pharmacy hours:  Phone [phone]  Monday 8:30 am-7pm  Tuesday-Friday 8:30am-6 pm                                       Mychart assistance [phone]        We would like to hear from you, how was your visit today?    Madisyn Powers  Patient Information Supervisor   Patient Care Supervisor  [facility], and [facility], [facility]  [phone]         Delbert Wade MD, MD  [facility]

## 2018-10-15 NOTE — MR AVS SNAPSHOT
After Visit Summary   10/15/2018    Sudhakar Abdalla    MRN: [id number]           Patient Information     Date Of Birth          1990        Visit Information        Provider Department      10/15/2018 1:00 PM Lidia Hancock DC [facility]        Today's Diagnoses     Segmental dysfunction of thoracic region        Segmental dysfunction of lumbar region        Lumbago        Scoliosis        Segmental dysfunction of sacral region           Follow-ups after your visit        Your next 10 appointments already scheduled     Oct 15, 2018  2:00 PM CDT   Office Visit with Delbert Wade MD   [facility] ([facility])    [address] [address] [phone]           Bring a current list of meds and any records pertaining to this visit. For Physicals, please bring immunization records and any forms needing to be filled out. Please arrive 10 minutes early to complete paperwork.              Who to contact     If you have questions or need follow up information about today's clinic visit or your schedule please contact [facility] directly at [phone].  Normal or non-critical lab and imaging results will be communicated to you by [employer], letter or phone within 4 business days after the clinic has received the results. If you do not hear from us within 7 days, please contact the clinic through [employer] or phone. If you have a critical or abnormal lab result, we will notify you by phone as soon as possible.  Submit refill requests through [employer] or call your pharmacy and they will forward the refill request to us. Please allow 3 business days for your refill to be completed.          Additional Information About Your Visit        [employer] Information     [employer] gives you secure access to your electronic health record. If you see a primary care provider, you can also send messages to your care  team and make appointments. If you have questions, please call your primary care clinic.  If you do not have a primary care provider, please call [phone] and they will assist you.        Care EveryWhere ID     This is your Care EveryWhere ID. This could be used by other organizations to access your [place] medical records  [id number]         Blood Pressure from Last 3 Encounters:   09/27/18 125/86   08/12/18 119/84   06/21/18 110/68    Weight from Last 3 Encounters:   09/27/18 59 kg (130 lb)   06/21/18 54.7 kg (120 lb 9.6 oz)   06/05/17 59.4 kg (130 lb 14.4 oz)              We Performed the Following     CHIROPRAC MANIP,SPINAL,3-4 REGIONS        Primary Care Provider Office Phone # Fax #    Delbert Wade [phone] [address] [place] [address]        Equal Access to Services     [facility]: Hadii aad ku hadasho [place], waaxda luqadaha, qaybta kaalmada adeegyada, alfonso merida . So [place] [phone].    ATENCIÓN: Si fahad willis, tiene a zamora disposición servicios gratuitos de asistencia lingüística. Llame al [phone].    We comply with applicable federal civil rights laws and [place] laws. We do not discriminate on the basis of race, color, national origin, age, disability, sex, sexual orientation, or gender identity.            Thank you!     Thank you for choosing [place] [facility]  for your care. Our goal is always to provide you with excellent care. Hearing back from our patients is one way we can continue to improve our services. Please take a few minutes to complete the written survey that you may receive in the mail after your visit with us. Thank you!             Your Updated Medication List - Protect others around you: Learn how to safely use, store and throw away your medicines at www.disposemymeds.org.          This list is accurate as of 10/15/18  1:20 PM.  Always use your most recent med list.                    Brand Name Dispense Instructions for use Diagnosis    EXCEDRIN PO      Take 500 mg by mouth 2 times daily as needed        HYDROcodone-acetaminophen 5-325 MG per tablet    NORCO    6 tablet    Take 1-2 tablets by mouth every 8 hours as needed for severe pain        ibuprofen 200 MG tablet    ADVIL/MOTRIN     Take 3 tablets (600 mg) by mouth every 6 hours as needed for pain or fever        nicotine 10 MG Inhaler    NICOTROL    180 each    Inhale 6-16 Cartridges into the lungs daily as needed for smoking cessation    Tobacco use disorder       sertraline 50 MG tablet    ZOLOFT    30 tablet    TAKE ONE TABLET BY MOUTH EVERY DAY    Anger, Premature ejaculation

## 2018-10-16 ASSESSMENT — ANXIETY QUESTIONNAIRES: GAD7 TOTAL SCORE: 7

## 2018-10-16 ASSESSMENT — PATIENT HEALTH QUESTIONNAIRE - PHQ9: SUM OF ALL RESPONSES TO PHQ QUESTIONS 1-9: 14

## 2018-10-22 ENCOUNTER — THERAPY VISIT (OUTPATIENT)
Dept: CHIROPRACTIC MEDICINE | Facility: CLINIC | Age: 28
End: 2018-10-22
Payer: MEDICAID

## 2018-10-22 DIAGNOSIS — M99.02 SEGMENTAL DYSFUNCTION OF THORACIC REGION: ICD-10-CM

## 2018-10-22 DIAGNOSIS — M99.04 SEGMENTAL DYSFUNCTION OF SACRAL REGION: ICD-10-CM

## 2018-10-22 DIAGNOSIS — M41.9 SCOLIOSIS: ICD-10-CM

## 2018-10-22 DIAGNOSIS — M99.03 SEGMENTAL DYSFUNCTION OF LUMBAR REGION: ICD-10-CM

## 2018-10-22 DIAGNOSIS — M54.50 LUMBAGO: ICD-10-CM

## 2018-10-22 PROCEDURE — 98941 CHIROPRACT MANJ 3-4 REGIONS: CPT | Mod: AT | Performed by: CHIROPRACTOR

## 2018-10-22 NOTE — PROGRESS NOTES
"Visit #:  5 of 8 based on treatment plan 10/1/2018    Subjective:  Sudhakar Abdalla is a 28 year old male who is seen in f/u up for:        Segmental dysfunction of thoracic region  Segmental dysfunction of lumbar region  Lumbago  Scoliosis  Segmental dysfunction of sacral region.     Since last visit on 10/15/2018,  Sudhakar Abdalla reports the following changes: Patient presents and states that he is doing okay. He worked 25 hours in the last 36, so he is feeling pain from that. His lower back hurts as usual. He rates his current pain 4/10. He states that after he left here last week, he saw Dr. Wade who prescribed him a muscle relaxer, that \"helps while he is sleeping.\" He states that he has \"never slept so good.\"     Objective:  The following was observed:    P: palpatory tenderness left lower ribs posteriorly    A: static palpation demonstrates intersegmental asymmetry     R: motion palpation notes restricted motion    T: muscle spasm at level(s): Lumbar erector spine L>>R      Assessment:    Segmental spinal dysfunction/restrictions found at:  T1 LR, RRR  T4 LR, RRR  T9 LR, RRR  L2 LR, RRR  Left SI posterior, restricted P to A    Diagnoses:      1. Segmental dysfunction of thoracic region    2. Segmental dysfunction of lumbar region    3. Lumbago    4. Scoliosis    5. Segmental dysfunction of sacral region        Patient's condition:  Patient had restrictions pre-manipulation and Patient had decreased motion prior to manipulation    Treatment effectiveness:  Post manipulation there is better intersegmental movement and Patient claims to feel looser post manipulation      Procedures:  CMT:  57428 Chiropractic manipulative treatment 3-4 regions performed   Thoracic: Activator, T1, T4, T9, Prone  Lumbar: Drop Table, L4, Prone  Pelvis: Drop Table, Left SI, Prone   Activator to left lower ribs.    Modalities:  53762: MSTM:  To Levator scapulae  for 5 min    Therapeutic procedures:  Gave patient Ice instructions post " adjustment, and instructions for acute care      Prognosis: Good    Progress towards Goals:   Decrease pain in thoracic and lumbar spine as it relates to scoliosis.  Reduce effects of scoliosis on MSK system.        Response to Treatment:   Reduction of symptoms with care, but then going to work exacerbates his pain. Patient felt he was more exacerbated from extra hours over the weekend.       Recommendations:    Instructions:ice 20 minutes every other hour as needed and stretch as instructed at visit    Follow-up:  Return to care next week.

## 2018-10-22 NOTE — MR AVS SNAPSHOT
After Visit Summary   10/22/2018    Sudhakar Abdalla    MRN: [id number]           Patient Information     Date Of Birth          1990        Visit Information        Provider Department      10/22/2018 1:00 PM Lidia Hancock DC [facility]        Today's Diagnoses     Segmental dysfunction of thoracic region        Segmental dysfunction of lumbar region        Lumbago        Scoliosis        Segmental dysfunction of sacral region           Follow-ups after your visit        Your next 10 appointments already scheduled     Oct 29, 2018  1:00 PM CDT   [facility] with Lidia Hancock DC   [facility] ([facility])    [address] [id number] [phone]            Nov 12, 2018  2:15 PM CST   Office Visit with Delbert Wade MD   [facility] ([facility]    [address] [address] [phone]           Bring a current list of meds and any records pertaining to this visit. For Physicals, please bring immunization records and any forms needing to be filled out. Please arrive 10 minutes early to complete paperwork.              Who to contact     If you have questions or need follow up information about today's clinic visit or your schedule please contact [facility] directly at [phone].  Normal or non-critical lab and imaging results will be communicated to you by MyChart, letter or phone within 4 business days after the clinic has received the results. If you do not hear from us within 7 days, please contact the clinic through MyChart or phone. If you have a critical or abnormal lab result, we will notify you by phone as soon as possible.  Submit refill requests through [employer] or call your pharmacy and they will forward the refill request to us. Please allow 3 business days for your refill to be completed.          Additional  Information About Your Visit        [employer] Information     [employer] gives you secure access to your electronic health record. If you see a primary care provider, you can also send messages to your care team and make appointments. If you have questions, please call your primary care clinic.  If you do not have a primary care provider, please call [phone] and they will assist you.        Care EveryWhere ID     This is your Care EveryWhere ID. This could be used by other organizations to access your [place] medical records  [id number]         Blood Pressure from Last 3 Encounters:   10/15/18 106/66   09/27/18 125/86   08/12/18 119/84    Weight from Last 3 Encounters:   10/15/18 57.1 kg (125 lb 12.8 oz)   09/27/18 59 kg (130 lb)   06/21/18 54.7 kg (120 lb 9.6 oz)              We Performed the Following     CHIROPRAC MANIP,SPINAL,3-4 REGIONS        Primary Care Provider Office Phone # Fax #    Delbert Wade [phone] [address] [place] [address]        Equal Access to Services     [facility]: Hadii aad ku hadasho So[place], waaxda luqadaha, qaybta kaalmada timmy, alfonso merida . So [facility] [phone].    ATENCIÓN: Si habla español, tiene a zamora disposición servicios gratuitos de asistencia lingüística. Clarence al [phone].    We comply with applicable federal civil rights laws and [place] laws. We do not discriminate on the basis of race, color, national origin, age, disability, sex, sexual orientation, or gender identity.            Thank you!     Thank you for choosing [place] [facility]  for your care. Our goal is always to provide you with excellent care. Hearing back from our patients is one way we can continue to improve our services. Please take a few minutes to complete the written survey that you may receive in the mail after your visit with us. Thank you!             Your Updated Medication List - Protect others  around you: Learn how to safely use, store and throw away your medicines at www.disposemymeds.org.          This list is accurate as of 10/22/18  1:16 PM.  Always use your most recent med list.                   Brand Name Dispense Instructions for use Diagnosis    cyclobenzaprine 10 MG tablet    FLEXERIL    90 tablet    Take 1 tablet (10 mg) by mouth 3 times daily as needed for muscle spasms    Chronic left-sided low back pain without sciatica       EXCEDRIN PO      Take 500 mg by mouth 2 times daily as needed        ibuprofen 200 MG tablet    ADVIL/MOTRIN     Take 3 tablets (600 mg) by mouth every 6 hours as needed for pain or fever        ranitidine 150 MG tablet    ZANTAC    60 tablet    Take 1 tablet (150 mg) by mouth 2 times daily    Abdominal pain, epigastric       sertraline 100 MG tablet    ZOLOFT    90 tablet    Take 1 tablet (100 mg) by mouth daily    Anger, Premature ejaculation, Major depressive disorder with single episode, in partial remission (H)

## 2018-10-29 ENCOUNTER — THERAPY VISIT (OUTPATIENT)
Dept: CHIROPRACTIC MEDICINE | Facility: CLINIC | Age: 28
End: 2018-10-29
Payer: MEDICAID

## 2018-10-29 DIAGNOSIS — M99.02 SEGMENTAL DYSFUNCTION OF THORACIC REGION: ICD-10-CM

## 2018-10-29 DIAGNOSIS — M54.50 LUMBAGO: ICD-10-CM

## 2018-10-29 DIAGNOSIS — M99.04 SEGMENTAL DYSFUNCTION OF SACRAL REGION: ICD-10-CM

## 2018-10-29 DIAGNOSIS — M41.9 SCOLIOSIS: ICD-10-CM

## 2018-10-29 DIAGNOSIS — M99.03 SEGMENTAL DYSFUNCTION OF LUMBAR REGION: ICD-10-CM

## 2018-10-29 PROCEDURE — 98941 CHIROPRACT MANJ 3-4 REGIONS: CPT | Mod: AT | Performed by: CHIROPRACTOR

## 2018-10-29 NOTE — MR AVS SNAPSHOT
After Visit Summary   10/29/2018    Sudhakar Abdalla    MRN: [id number]           Patient Information     Date Of Birth          1990        Visit Information        Provider Department      10/29/2018 1:00 PM Lidia Hancock DC [facility]        Today's Diagnoses     Segmental dysfunction of thoracic region        Segmental dysfunction of lumbar region        Lumbago        Scoliosis        Segmental dysfunction of sacral region           Follow-ups after your visit        Your next 10 appointments already scheduled     Nov 05, 2018  1:00 PM CST   [facility] Chiropractor with Lidia Hancock DC   [facility] ([facility]    [address] [id number] [phone]            Nov 12, 2018  2:15 PM CST   Office Visit with Delbert Wade MD   [facility]    [address] [address] [phone]           Bring a current list of meds and any records pertaining to this visit. For Physicals, please bring immunization records and any forms needing to be filled out. Please arrive 10 minutes early to complete paperwork.              Who to contact     If you have questions or need follow up information about today's clinic visit or your schedule please contact [facility] directly at [phone].  Normal or non-critical lab and imaging results will be communicated to you by MyChart, letter or phone within 4 business days after the clinic has received the results. If you do not hear from us within 7 days, please contact the clinic through MyChart or phone. If you have a critical or abnormal lab result, we will notify you by phone as soon as possible.  Submit refill requests through Flytenow or call your pharmacy and they will forward the refill request to us. Please allow 3 business days for your refill to be completed.          Additional  Information About Your Visit        SchemaLogichart Information     Getfugu gives you secure access to your electronic health record. If you see a primary care provider, you can also send messages to your care team and make appointments. If you have questions, please call your primary care clinic.  If you do not have a primary care provider, please call [phone] and they will assist you.        Care EveryWhere ID     This is your Care EveryWhere ID. This could be used by other organizations to access your [place] medical records  [id number]         Blood Pressure from Last 3 Encounters:   10/15/18 106/66   09/27/18 125/86   08/12/18 119/84    Weight from Last 3 Encounters:   10/15/18 57.1 kg (125 lb 12.8 oz)   09/27/18 59 kg (130 lb)   06/21/18 54.7 kg (120 lb 9.6 oz)              We Performed the Following     CHIROPRAC MANIP,SPINAL,3-4 REGIONS        Primary Care Provider Office Phone # Fax #    Delbert Wade [phone] [address] [place] [address]        Equal Access to Services     [facility]: Hadii aad ku hadasho Soomaali, waaxda luqadaha, qaybta kaalmada timmy, alfonso merida . So [facility] [phone].    ATENCIÓN: Si habla español, tiene a zamora disposición servicios gratuitos de asistencia lingüística. Clarence al [phone].    We comply with applicable federal civil rights laws and [place] laws. We do not discriminate on the basis of race, color, national origin, age, disability, sex, sexual orientation, or gender identity.            Thank you!     Thank you for choosing [facility]  for your care. Our goal is always to provide you with excellent care. Hearing back from our patients is one way we can continue to improve our services. Please take a few minutes to complete the written survey that you may receive in the mail after your visit with us. Thank you!             Your Updated Medication List - Protect others  around you: Learn how to safely use, store and throw away your medicines at www.disposemymeds.org.          This list is accurate as of 10/29/18  1:16 PM.  Always use your most recent med list.                   Brand Name Dispense Instructions for use Diagnosis    cyclobenzaprine 10 MG tablet    FLEXERIL    90 tablet    Take 1 tablet (10 mg) by mouth 3 times daily as needed for muscle spasms    Chronic left-sided low back pain without sciatica       EXCEDRIN PO      Take 500 mg by mouth 2 times daily as needed        ibuprofen 200 MG tablet    ADVIL/MOTRIN     Take 3 tablets (600 mg) by mouth every 6 hours as needed for pain or fever        ranitidine 150 MG tablet    ZANTAC    60 tablet    Take 1 tablet (150 mg) by mouth 2 times daily    Abdominal pain, epigastric       sertraline 100 MG tablet    ZOLOFT    90 tablet    Take 1 tablet (100 mg) by mouth daily    Anger, Premature ejaculation, Major depressive disorder with single episode, in partial remission (H)

## 2018-10-29 NOTE — PROGRESS NOTES
Visit #:  6 of 8 based on treatment plan 10/1/2018    Subjective:  Sudhakar Abdalla is a 28 year old male who is seen in f/u up for:        Segmental dysfunction of thoracic region  Segmental dysfunction of lumbar region  Lumbago  Scoliosis  Segmental dysfunction of sacral region.     Since last visit on 10/22/2018,  Sudhakar Abdalla reports the following changes: Patient presents and states that he is in pain today from moving a keg at work this weekend, and working 13 hours straight yesterday. He wasn't able to fall asleep last night as a result. He rates his pain 6/10 in his lower back today.        Objective:  The following was observed:    P: palpatory tenderness left lower ribs posteriorly    A: static palpation demonstrates intersegmental asymmetry     R: motion palpation notes restricted motion    T: muscle spasm at level(s): Lumbar erector spine L>>R      Assessment:    Segmental spinal dysfunction/restrictions found at:  T1 RR, LRR  T4 RR, LRR with right posterior 4th/5th rib  T9 LR, RRR  L4 LR, RRR  Left SI posterior, restricted P to A    Diagnoses:      1. Segmental dysfunction of thoracic region    2. Segmental dysfunction of lumbar region    3. Lumbago    4. Scoliosis    5. Segmental dysfunction of sacral region        Patient's condition:  Patient had restrictions pre-manipulation and Patient had decreased motion prior to manipulation    Treatment effectiveness:  Post manipulation there is better intersegmental movement and Patient claims to feel looser post manipulation      Procedures:  CMT:  44697 Chiropractic manipulative treatment 3-4 regions performed   Thoracic: Activator, T1, T4, T9, Prone  Lumbar: Drop Table, L4, Prone  Pelvis: Drop Table, Left SI, Prone   Activator to left lower ribs.    Modalities:  37412: MSTM:  To Levator scapulae  for 5 min    Therapeutic procedures:  Gave patient Ice instructions post adjustment, and instructions for acute care      Prognosis: Good    Progress towards  Goals:   Decrease pain in thoracic and lumbar spine as it relates to scoliosis.  Reduce effects of scoliosis on MSK system.        Response to Treatment:   Reduction of symptoms with care, but then going to work exacerbates his pain. Patient felt he was more exacerbated from extra hours over the weekend.       Recommendations:    Instructions:ice 20 minutes every other hour as needed and stretch as instructed at visit    Follow-up:  Return to care next week.

## 2018-12-03 ENCOUNTER — HOSPITAL ENCOUNTER (EMERGENCY)
Facility: CLINIC | Age: 28
Discharge: HOME | End: 2018-12-04
Attending: FAMILY MEDICINE | Admitting: FAMILY MEDICINE
Payer: MEDICAID

## 2018-12-03 DIAGNOSIS — S61.011A THUMB LACERATION, RIGHT, INITIAL ENCOUNTER: ICD-10-CM

## 2018-12-03 PROCEDURE — 99283 EMERGENCY DEPT VISIT LOW MDM: CPT | Mod: 25 | Performed by: FAMILY MEDICINE

## 2018-12-03 PROCEDURE — 12001 RPR S/N/AX/GEN/TRNK 2.5CM/<: CPT | Mod: Z6 | Performed by: FAMILY MEDICINE

## 2018-12-03 PROCEDURE — 12001 RPR S/N/AX/GEN/TRNK 2.5CM/<: CPT | Performed by: FAMILY MEDICINE

## 2018-12-03 PROCEDURE — 99283 EMERGENCY DEPT VISIT LOW MDM: CPT | Performed by: FAMILY MEDICINE

## 2018-12-03 NOTE — ED AVS SNAPSHOT
[facility] Emergency Department    [address] [place] [address]    Phone:  [phone]    Fax:  [phone]                                       Sudhakar Abdalla   MRN: [id number]    Department:  [facility] Emergency Department   Date of Visit:  12/3/2018           After Visit Summary Signature Page     I have received my discharge instructions, and my questions have been answered. I have discussed any challenges I see with this plan with the nurse or doctor.    ..........................................................................................................................................  Patient/Patient Representative Signature      ..........................................................................................................................................  Patient Representative Print Name and Relationship to Patient    ..................................................               ................................................  Date                                   Time    ..........................................................................................................................................  Reviewed by Signature/Title    ...................................................              ..............................................  Date                                               Time          22EPIC Rev 08/18

## 2018-12-03 NOTE — LETTER
1990      To Whom it may concern:    Sudhakar Abdalla was seen in our Emergency Department today, 12/04/18 for an injury to his right thumb that required suturing.    The injury occurred on 12/4/2018    He may return to work but have encouraged him not to soak the wound in water for the next 10 days until the sutures are removed and to keep the area clean.      Follow up: as needed for increased symptoms or signs of infection.    Sincerely,      Henry Diamond  Physician  [facility] Emergency Room

## 2018-12-03 NOTE — ED AVS SNAPSHOT
[facility] Emergency Department    [address] [place] [address]    Phone:  [phone]    Fax:  [phone]                                       Sudhakar Abdalla   MRN: [id number]    Department:  [facility] Emergency Department   Date of Visit:  12/3/2018           Patient Information     Date Of Birth          1990        Your diagnoses for this visit were:     Thumb laceration, right, initial encounter 1.5cm       You were seen by Henry Diamond DO.      Follow-up Information     Follow up with Delbert Wade MD.    Specialty:  Family Practice    Why:  As needed    Contact information:    [address] [place] [address] [phone]          Discharge Instructions       Please read and follow the handout(s) instructions. Return, if needed, for increased or worsening symptoms and as directed by the handout(s).    See the note for work.    Have the stitches removed in about 10 days    I hope you feel better soon!    Electronically signed, Henry Diamond DO      Discharge References/Attachments     LACERATION, HAND: ALL CLOSURES (ENGLISH)      24 Hour Appointment Hotline       To make an appointment at any [facility], call [phone] ([phone]). If you don't have a family doctor or clinic, we will help you find one. [facility] clinics are conveniently located to serve the needs of you and your family.             Review of your medicines      Our records show that you are taking the medicines listed below. If these are incorrect, please call your family doctor or clinic.        Dose / Directions Last dose taken    cyclobenzaprine 10 MG tablet   Commonly known as:  FLEXERIL   Dose:  10 mg   Quantity:  90 tablet        Take 1 tablet (10 mg) by mouth 3 times daily as needed for muscle spasms   Refills:  1        EXCEDRIN PO   Dose:  500 mg        Take 500 mg by mouth 2 times daily as needed   Refills:  0        ibuprofen 200 MG tablet    Commonly known as:  ADVIL/MOTRIN   Dose:  600 mg        Take 3 tablets (600 mg) by mouth every 6 hours as needed for pain or fever   Refills:  0        ranitidine 150 MG tablet   Commonly known as:  ZANTAC   Dose:  150 mg   Quantity:  60 tablet        Take 1 tablet (150 mg) by mouth 2 times daily   Refills:  1        sertraline 100 MG tablet   Commonly known as:  ZOLOFT   Dose:  100 mg   Quantity:  90 tablet        Take 1 tablet (100 mg) by mouth daily   Refills:  1                Orders Needing Specimen Collection     None      Pending Results     No orders found for last 3 day(s).            Pending Culture Results     No orders found for last 3 day(s).            Pending Results Instructions     If you had any lab results that were not finalized at the time of your Discharge, you can call the ED Lab Result RN at [phone]. You will be contacted by this team for any positive Lab results or changes in treatment. The nurses are available 7 days a week from 10A to 6:30P.  You can leave a message 24 hours per day and they will return your call.        Thank you for choosing [facility]       Thank you for choosing [facility] for your care. Our goal is always to provide you with excellent care. Hearing back from our patients is one way we can continue to improve our services. Please take a few minutes to complete the written survey that you may receive in the mail after you visit with us. Thank you!        DocuSignhart Information     [employer] gives you secure access to your electronic health record. If you see a primary care provider, you can also send messages to your care team and make appointments. If you have questions, please call your primary care clinic.  If you do not have a primary care provider, please call [phone] and they will assist you.        Care EveryWhere ID     This is your Care EveryWhere ID. This could be used by other organizations to access your [facility] medical records  [id number]        Equal  Access to Services     [facility] : José Miguel Winslow, janneth brunson, qaalfonso jay. So [facility] [phone].    ATENCIÓN: Si habla español, tiene a zamora disposición servicios gratuitos de asistencia lingüística. Llame al [phone].    We comply with applicable federal civil rights laws and [place] laws. We do not discriminate on the basis of race, color, national origin, age, disability, sex, sexual orientation, or gender identity.            After Visit Summary       This is your record. Keep this with you and show to your community pharmacist(s) and doctor(s) at your next visit.

## 2018-12-04 ENCOUNTER — PATIENT OUTREACH (OUTPATIENT)
Dept: CARE COORDINATION | Facility: CLINIC | Age: 28
End: 2018-12-04

## 2018-12-04 VITALS
TEMPERATURE: 98.5 F | OXYGEN SATURATION: 98 % | DIASTOLIC BLOOD PRESSURE: 98 MMHG | HEART RATE: 74 BPM | SYSTOLIC BLOOD PRESSURE: 127 MMHG | RESPIRATION RATE: 16 BRPM

## 2018-12-04 RX ORDER — BUPIVACAINE HYDROCHLORIDE 2.5 MG/ML
5 INJECTION, SOLUTION INFILTRATION; PERINEURAL ONCE
Status: DISCONTINUED | OUTPATIENT
Start: 2018-12-04 | End: 2018-12-04 | Stop reason: HOSPADM

## 2018-12-04 ASSESSMENT — ENCOUNTER SYMPTOMS: WOUND: 1

## 2018-12-04 NOTE — ED PROVIDER NOTES
History     Chief Complaint   Patient presents with     Laceration     HPI  Sudhakar Abdalla is a 28 year old male who presents to the emergency room secondary to concerns of a laceration to the posterior aspect of his right thumb.  Patient states that he cut the thumb on a hinge of a oven door that he was attempting to clean.  He denies any loss of feeling or muscle strength to the thumb and states he has full range of motion to the thumb.  He has been holding pressure on it to control bleeding.  He is up-to-date on his tetanus receiving it in 2012.    Problem List:    Patient Active Problem List    Diagnosis Date Noted     Segmental dysfunction of thoracic region 10/01/2018     Priority: Medium     Segmental dysfunction of lumbar region 10/01/2018     Priority: Medium     Segmental dysfunction of sacral region 10/01/2018     Priority: Medium     Lumbago 10/01/2018     Priority: Medium     Scoliosis 10/01/2018     Priority: Medium     Chronic bilateral low back pain with bilateral sciatica 06/21/2018     Priority: Medium     Anger 06/21/2018     Priority: Medium     Premature ejaculation 06/21/2018     Priority: Medium     Tobacco use disorder 12/28/2016     Priority: Medium     History of ADHD 06/01/2016     Priority: Medium     Hx of migraines 06/01/2016     Priority: Medium     Hx of scoliosis 06/01/2016     Priority: Medium        Past Medical History:    Past Medical History:   Diagnosis Date     History of ADHD 6/1/2016     Hx of migraines 6/1/2016     Hx of scoliosis 6/1/2016       Past Surgical History:    Past Surgical History:   Procedure Laterality Date     HEAD & NECK SURGERY  1999    progressive scoliosis       Family History:    Family History   Problem Relation Age of Onset     Diabetes Mother      Coronary Artery Disease Mother      Hypertension Mother      Cerebrovascular Disease Mother      Obesity Mother      Aneurysm Father      Alzheimer Disease Paternal Grandfather        Social  "History:  Marital Status:  Single [1]  Social History   Substance Use Topics     Smoking status: Current Every Day Smoker     Packs/day: 1.00     Years: 12.00     Types: Cigarettes     Start date: 8/24/2004     Smokeless tobacco: Former User     Alcohol use 0.0 oz/week      Comment: rare        Medications:      Aspirin-Acetaminophen-Caffeine (EXCEDRIN PO)   cyclobenzaprine (FLEXERIL) 10 MG tablet   ibuprofen (ADVIL/MOTRIN) 200 MG tablet   ranitidine (ZANTAC) 150 MG tablet   sertraline (ZOLOFT) 100 MG tablet         Review of Systems   Skin: Positive for wound (Laceration right thumb.).   All other systems reviewed and are negative.      Physical Exam   BP: (!) 135/110  Pulse: 83  Temp: 98.5  F (36.9  C)  Resp: 18  SpO2: 98 %      Physical Exam   Constitutional: He appears well-developed and well-nourished. No distress.   Musculoskeletal:        Right hand: He exhibits laceration. He exhibits normal range of motion, no bony tenderness and normal capillary refill. Normal sensation noted. Normal strength noted.        Hands:  Skin: Laceration (right thumb, 1.5 cm) noted.   Nursing note and vitals reviewed.      ED Course     ED Course     Laceration repair  Date/Time: 12/4/2018 12:29 AM  Performed by: MICHAEL KHAN  Authorized by: MICHAEL KHAN   Consent: Verbal consent obtained.  Risks and benefits: risks, benefits and alternatives were discussed  Consent given by: patient  Patient understanding: patient states understanding of the procedure being performed  Patient identity confirmed: verbally with patient  Time out: Immediately prior to procedure a \"time out\" was called to verify the correct patient, procedure, equipment, support staff and site/side marked as required.  Body area: upper extremity  Location details: right thumb  Laceration length: 1.5 cm  Tendon involvement: none  Nerve involvement: none  Anesthesia: local infiltration    Anesthesia:  Local Anesthetic: bupivacaine 0.25% without " epinephrine  Anesthetic total: 2 mL    Sedation:  Patient sedated: no  Preparation: Patient was prepped and draped in the usual sterile fashion.  Irrigation solution: saline  Irrigation method: syringe  Amount of cleaning: standard  Debridement: none  Degree of undermining: none  Skin closure: 4-0 nylon  Number of sutures: 3  Technique: simple  Approximation: close  Approximation difficulty: simple  Dressing: antibiotic ointment  Patient tolerance: Patient tolerated the procedure well with no immediate complications                     Critical Care time:  none            Assessments & Plan (with Medical Decision Making)  28-year-old to the ER secondary concerns laceration his right thumb.  Patient injured it on the hinge of an open door in his attempts to clean the often.  Patient with exam findings consistent with 1.5 cm laceration to the posterior aspect of his right thumb without evidence for nerve or tendon injury.  Laceration repaired after thorough cleansing.  Patient up-to-date on his tetanus immunization.  Wound care instructions given to patient.  Sutures to be removed in approximately 10-12 days.     I have reviewed the nursing notes.    I have reviewed the findings, diagnosis, plan and need for follow up with the patient.       Discharge Medication List as of 12/4/2018  1:03 AM               I verbally discussed the findings of the evaluation today in the ER. I have verbally discussed with Sudhakar the suggested treatment(s) as described in the discharge instructions and handouts. I have prescribed the above listed medications and instructed him on appropriate use of these medications.      I have verbally suggested he follow-up in his clinic or return to the ER for increased symptoms. See the follow-up recommendations documented  in the after visit summary in this visit's EPIC chart.  Final diagnoses:   Thumb laceration, right, initial encounter - 1.5cm       12/3/2018   [facility]  [facility]     eHnry Diamond,   12/04/18 045

## 2018-12-04 NOTE — LETTER
Health Care Home - Access Care Plan    About Me  Patient Name:  Sudhakar Abdalla    YOB: 1990  Age:                             28 year old   Bre MRN:            [id number] Telephone Information:     Home Phone [phone]   Mobile Not on file.       Address:    [address] [place] [address]  [place] [address] Email address:  [email or web address]      Emergency Contact(s)  Name Relationship Lgl Grd Work Phone Home Phone Mobile Phone   1. SHEIAL ORTIZ Significant ot*   [phone]   2. NOREEN, MATT* Relative   [phone]             Health Maintenance:      My Access Plan  Medical Emergency 911   Questions or concerns during clinic hours Primary Clinic Line, I will call the clinic directly: [facility] [place] - [phone]   24 Hour Appointment Line [phone] or  [phone] [phone]) (toll free)   24 Hour Nurse Line [phone] (toll free)   Questions or concerns outside clinic hours 24 Hour Appointment Line, I will call the after-hours on-call line:   [facility] [phone] or [phone] ([phone]) (toll-free)   Preferred Urgent Care     Preferred Hospital     Preferred Pharmacy [facility] [place], [place] - [address]     Behavioral Health Crisis Line The National Suicide Prevention Lifeline at 1-311.786.5452 or 911     My Care Team Members  Patient Care Team       Relationship Specialty Notifications Start End    Delbert Wade MD PCP - General Family Practice  5/16/18     Phone: [phone] Fax: [phone]         [address] [place] [address]    Dariana Geiger, RN Clinic Care Coordinator Primary Care - [facility] Admissions 12/4/18     Phone: [phone] Fax: [phone]               My Medical and Care Information  Problem List   Patient Active Problem List   Diagnosis     History of ADHD     Hx of migraines     Hx of scoliosis     Tobacco use disorder     Chronic  bilateral low back pain with bilateral sciatica     Anger     Premature ejaculation     Segmental dysfunction of thoracic region     Segmental dysfunction of lumbar region     Segmental dysfunction of sacral region     Lumbago     Scoliosis

## 2018-12-04 NOTE — PROGRESS NOTES
Clinic Care Coordination Contact  RUST/Voicemail     IP report.   ER for thumb laceration. #3 sutures. To be removed in 10-12 days. Patient does not have a pending suture/MD removal appointment.     Clinical Data: Care Coordinator Outreach  Outreach attempted x 1.  Left message on voicemail with call back information and requested return call.  Plan: Care Coordinator will mail out care coordination introduction letter with care coordinator contact information and explanation of care coordination services. Care Coordinator will try to reach patient again in 1-2 business days.    CASEY Reyes RN Clinic Care Coordinator   [place], [place], [place]  Phone: [phone]

## 2018-12-04 NOTE — DISCHARGE INSTRUCTIONS
Please read and follow the handout(s) instructions. Return, if needed, for increased or worsening symptoms and as directed by the handout(s).    See the note for work.    Have the stitches removed in about 10 days    I hope you feel better soon!    Electronically signed, Henry Diamond DO

## 2018-12-04 NOTE — LETTER
[facility]  [address] [place] [address]    December 4, 2018    Sudhakar Abdalla  [address]  [place] [facility] [address]      Dear Sudhakar,    I am a clinic care coordinator who works with Delbert Wade MD, MD at [facility].  I wanted to introduce myself and provide you with my contact information so that you can call me with questions or concerns about your health care. Below is a description of clinic care coordination and how I can further assist you.     The clinic care coordinator is a registered nurse and/or  who understand the health care system. The goal of clinic care coordination is to help you manage your health and improve access to the [facility] system in the most efficient manner. The registered nurse can assist you in meeting your health care goals by providing education, coordinating services, and strengthening the communication among your providers. The  can assist you with financial, behavioral, psychosocial, chemical dependency, counseling, and/or psychiatric resources.    Please feel free to contact me at [phone], with any questions or concerns. We at [facility] are focused on providing you with the highest-quality healthcare experience possible and that all starts with you.     Sincerely,   WALESKA ReyesN RN Clinic Care Coordinator   [place], [place], [place]  Phone: [phone]     Enclosed: I have enclosed a copy of a 24 Hour Access Plan. This has helpful phone numbers for you to call when needed. Please keep this in an easy to access place to use as needed.

## 2018-12-05 NOTE — PROGRESS NOTES
Clinic Care Coordination Contact  [facility]/Voicemail       Clinical Data: Care Coordinator Outreach  Outreach attempted x 2.  Left message on voicemail with call back information and requested return call.    Plan: Care Coordinator mailed out care coordination introduction letter on 12/4/18. Care Coordinator will do no further outreaches at this time.    WALESKA ReyesN RN Clinic Care Coordinator   [place], [place], [place]  Phone: [phone]

## 2018-12-10 PROBLEM — F32.4 MAJOR DEPRESSIVE DISORDER WITH SINGLE EPISODE, IN PARTIAL REMISSION (H): Status: ACTIVE | Noted: 2018-12-10

## 2019-02-16 DIAGNOSIS — R10.13 ABDOMINAL PAIN, EPIGASTRIC: ICD-10-CM

## 2019-02-18 NOTE — TELEPHONE ENCOUNTER
"Requested Prescriptions   Pending Prescriptions Disp Refills     ranitidine (ZANTAC) 150 MG tablet [Pharmacy Med Name: RANITIDINE HCL 150MG TABS] 60 tablet 1     Sig: TAKE ONE TABLET BY MOUTH TWICE A DAY    H2 Blockers Protocol Passed - 2/16/2019 11:48 PM       Passed - Patient is age 12 or older       Passed - Recent (12 mo) or future (30 days) visit within the authorizing provider's specialty    Patient had office visit in the last 12 months or has a visit in the next 30 days with authorizing provider or within the authorizing provider's specialty.  See \"Patient Info\" tab in inbasket, or \"Choose Columns\" in Meds & Orders section of the refill encounter.         Passed - Medication is active on med list        ranitidine (ZANTAC) 150 MG tablet  Routing refill request to provider for review/approval because:  A break in medication, should have needed refills 12/2018    Siomara Raphael RN, BSN           "

## 2019-03-18 ENCOUNTER — TELEPHONE (OUTPATIENT)
Dept: FAMILY MEDICINE | Facility: OTHER | Age: 29
End: 2019-03-18

## 2019-03-18 NOTE — TELEPHONE ENCOUNTER
Summary:    Patient is due/failing the following:   Depression follow up and PHQ9    Action needed:   Patient needs office visit for follow up. and Patient needs to do PHQ9.    Type of outreach:    phone no answer or voicemail. Reminder letter sent.     Questions for provider review:    None                                                                                                                                    Marisol Reveles     Chart routed to Care Team .          Panel Management Review      Patient has the following on his problem list:     Depression / Dysthymia review    Measure:  Needs PHQ-9 score of 4 or less during index window.  Administer PHQ-9 and if score is 5 or more, send encounter to provider for next steps.        PHQ-9 SCORE 6/21/2018 10/15/2018   PHQ-9 Total Score MyChart 9 (Mild depression) -   PHQ-9 Total Score 9 14       If PHQ-9 recheck is 5 or more, route to provider for next steps.    Patient is due for:  PHQ9      Composite cancer screening  Chart review shows that this patient is due/due soon for the following None

## 2019-03-18 NOTE — LETTER
[facility]  [address] [place] [address]  Phone: [phone]  March 18, 2019      Sudhakar Abdalla  [address] [address]      Dear Sudhakar,    We care about your health and have reviewed your health plan including your medical conditions, medications, and lab results.  Based on this review, it is recommended that you follow up regarding the following health topic(s):  -Depression    We recommend you take the following action(s):  -schedule a FOLLOWUP APPOINTMENT.  -Complete and return the attached PHQ-9 Form.  If your total score is greater than 9, please schedule a followup appointment.  If you answer Yes to question 9, call your clinic between the hours of 8 to 5.  You may also call the Suicide Hotline at 0-024-738-ZBQG (0951) any time.     Please call us at the [facility] - [phone] (or use [employer]) to address the above recommendations.     Thank you for trusting [facility] and we appreciate the opportunity to serve you.  We look forward to supporting your healthcare needs in the future.    Healthy Regards,    Your Health Care Team  [facility]

## 2019-03-20 ENCOUNTER — OFFICE VISIT (OUTPATIENT)
Dept: URGENT CARE | Facility: RETAIL CLINIC | Age: 29
End: 2019-03-20
Payer: COMMERCIAL

## 2019-03-20 VITALS
SYSTOLIC BLOOD PRESSURE: 114 MMHG | OXYGEN SATURATION: 97 % | TEMPERATURE: 97.9 F | DIASTOLIC BLOOD PRESSURE: 75 MMHG | HEART RATE: 74 BPM

## 2019-03-20 DIAGNOSIS — L30.9 DERMATITIS: Primary | ICD-10-CM

## 2019-03-20 PROCEDURE — 99213 OFFICE O/P EST LOW 20 MIN: CPT | Performed by: PHYSICIAN ASSISTANT

## 2019-03-20 RX ORDER — TRIAMCINOLONE ACETONIDE 1 MG/G
CREAM TOPICAL
Qty: 30 G | Refills: 1 | Status: SHIPPED | OUTPATIENT
Start: 2019-03-20 | End: 2019-10-17

## 2019-03-20 ASSESSMENT — ENCOUNTER SYMPTOMS
SINUS PAIN: 0
SHORTNESS OF BREATH: 0
SORE THROAT: 0
PALPITATIONS: 0
EYE ITCHING: 0
CHILLS: 0
SINUS PRESSURE: 0
DIARRHEA: 0
COUGH: 0
FATIGUE: 0
RHINORRHEA: 0
FEVER: 0
UNEXPECTED WEIGHT CHANGE: 0
NAUSEA: 0
ABDOMINAL PAIN: 0
WHEEZING: 0
VOMITING: 0
EYE DISCHARGE: 0
MYALGIAS: 0
EYE PAIN: 0
CONSTIPATION: 0
ARTHRALGIAS: 0
EYE REDNESS: 0

## 2019-03-20 NOTE — PROGRESS NOTES
SUBJECTIVE:   Sudhakar Abdalla is a 28 year old male presenting with a chief complaint of   Chief Complaint   Patient presents with     Derm Problem     Rash all over body x a month       He is an established patient of [facility].    Rash    Onset of rash was 1 month(s) ago.   Course of illness is sudden onset.  Severity mild  Current and Associated symptoms: itching   Location of the rash: wrists and lower legs .  Previous history of a similar rash? No  Recent exposure history: none known  Denies exposure to: none known  Associated symptoms include: nothing.  Treatment measures tried include: none        Review of Systems   Constitutional: Negative for chills, fatigue, fever and unexpected weight change.   HENT: Negative for congestion, ear pain, rhinorrhea, sinus pressure, sinus pain and sore throat.    Eyes: Negative for pain, discharge, redness and itching.   Respiratory: Negative for cough, shortness of breath and wheezing.    Cardiovascular: Negative for chest pain, palpitations and leg swelling.   Gastrointestinal: Negative for abdominal pain, constipation, diarrhea, nausea and vomiting.   Musculoskeletal: Negative for arthralgias and myalgias.   Skin: Positive for rash.       Past Medical History:   Diagnosis Date     History of ADHD 6/1/2016     Hx of migraines 6/1/2016     Hx of scoliosis 6/1/2016     Major depressive disorder with single episode, in partial remission (H) 12/10/2018     Family History   Problem Relation Age of Onset     Diabetes Mother      Coronary Artery Disease Mother      Hypertension Mother      Cerebrovascular Disease Mother      Obesity Mother      Aneurysm Father      Alzheimer Disease Paternal Grandfather      Current Outpatient Medications   Medication Sig Dispense Refill     Aspirin-Acetaminophen-Caffeine (EXCEDRIN PO) Take 500 mg by mouth 2 times daily as needed       triamcinolone (KENALOG) 0.1 % external cream Apply sparingly to affected area three times daily for 7-14 days. 30  g 1     cyclobenzaprine (FLEXERIL) 10 MG tablet Take 1 tablet (10 mg) by mouth 3 times daily as needed for muscle spasms (Patient not taking: Reported on 3/20/2019) 90 tablet 1     ibuprofen (ADVIL/MOTRIN) 200 MG tablet Take 3 tablets (600 mg) by mouth every 6 hours as needed for pain or fever (Patient not taking: Reported on 3/20/2019)       ranitidine (ZANTAC) 150 MG tablet TAKE ONE TABLET BY MOUTH TWICE A DAY (Patient not taking: Reported on 3/20/2019) 60 tablet 1     sertraline (ZOLOFT) 100 MG tablet Take 1 tablet (100 mg) by mouth daily (Patient not taking: Reported on 3/20/2019) 90 tablet 1     Social History     Tobacco Use     Smoking status: Current Every Day Smoker     Packs/day: 1.00     Years: 12.00     Pack years: 12.00     Types: Cigarettes     Start date: 8/24/2004     Smokeless tobacco: Former User   Substance Use Topics     Alcohol use: Yes     Alcohol/week: 0.0 oz     Comment: rare       OBJECTIVE  /75   Pulse 74   Temp 97.9  F (36.6  C) (Oral)   SpO2 97%     Physical Exam   Constitutional: He appears well-developed and well-nourished. No distress.   HENT:   Head: Normocephalic and atraumatic.   Right Ear: Tympanic membrane and ear canal normal.   Left Ear: Tympanic membrane and ear canal normal.   Mouth/Throat: Oropharynx is clear and moist.   Eyes: Conjunctivae are normal. Pupils are equal, round, and reactive to light.   Cardiovascular: Normal rate and regular rhythm.   Pulmonary/Chest: Effort normal and breath sounds normal.   Skin: Skin is warm and dry.   Dermatitis type rash on wrists and lower legs. No signs of infection    Psychiatric: He has a normal mood and affect. His behavior is normal.       Labs:  No results found for this or any previous visit (from the past 24 hour(s)).    X-Ray was not done.    ASSESSMENT:      ICD-10-CM    1. Dermatitis L30.9 triamcinolone (KENALOG) 0.1 % external cream        Medical Decision Making:    Differential Diagnosis:  Rash: Atopic dermatitis,  Candidiasis, Contact dermatitis, Eczema, Folliculitis    Serious Comorbid Conditions:  Adult:  None    PLAN:    Rash:  Will treat with triamcinolone cream x 7-14 days. Discussed how to apply this medication including areas to avoid such as face and groin. Avoid scratching. Watch for signs of infection. Return to clinic if symptoms worsen or do not improve; otherwise follow up as needed         Followup:    If not improving or if condition worsens, follow up with your Primary Care Provider    There are no Patient Instructions on file for this visit.

## 2019-04-18 ENCOUNTER — TELEPHONE (OUTPATIENT)
Dept: FAMILY MEDICINE | Facility: OTHER | Age: 29
End: 2019-04-18

## 2019-04-18 DIAGNOSIS — R45.4 ANGER: ICD-10-CM

## 2019-04-18 DIAGNOSIS — F52.4 PREMATURE EJACULATION: ICD-10-CM

## 2019-04-18 DIAGNOSIS — F32.4 MAJOR DEPRESSIVE DISORDER WITH SINGLE EPISODE, IN PARTIAL REMISSION (H): ICD-10-CM

## 2019-04-18 NOTE — LETTER
[facility]  [address] [facility] [address] [phone]          April 29, 2019    Sudhakar Abdalla                                                                                                                     [address] [place] [address]            Dear Sudhakar,    We were unable to reach you by phone. Dr. Wade does need to see you to follow up on your medication for the sertraline (ZOLOFT) 100 MG. Please give the clinic a call to schedule an appointment at [phone]      Sincerely,     Your [facility] Care Team

## 2019-04-19 NOTE — TELEPHONE ENCOUNTER
Zoloft:  Routing refill request to provider for review/approval because:  Patient needs to be seen because: overdue for mood follow up - was due in November    Nini Luevano, RN, BSN

## 2019-04-24 RX ORDER — SERTRALINE HYDROCHLORIDE 100 MG/1
TABLET, FILM COATED ORAL
Qty: 30 TABLET | Refills: 0 | Status: SHIPPED | OUTPATIENT
Start: 2019-04-24 | End: 2019-06-17

## 2019-04-24 NOTE — TELEPHONE ENCOUNTER
Spoke with pt and he states he had gotten a refill already from [facility]. I stated it looks like that would be the last refill so will need an appt. Pt stated he is not sure when he can do this as he will be going into treatment today at 1pm and he is not sure when he will be home. Will flag for provider to review.    Hilda Combs CMA ([facility])

## 2019-06-17 DIAGNOSIS — R10.13 ABDOMINAL PAIN, EPIGASTRIC: ICD-10-CM

## 2019-06-17 DIAGNOSIS — R45.4 ANGER: ICD-10-CM

## 2019-06-17 DIAGNOSIS — F52.4 PREMATURE EJACULATION: ICD-10-CM

## 2019-06-17 DIAGNOSIS — F32.4 MAJOR DEPRESSIVE DISORDER WITH SINGLE EPISODE, IN PARTIAL REMISSION (H): ICD-10-CM

## 2019-06-17 RX ORDER — SERTRALINE HYDROCHLORIDE 100 MG/1
TABLET, FILM COATED ORAL
Qty: 30 TABLET | Refills: 0 | Status: SHIPPED | OUTPATIENT
Start: 2019-06-17 | End: 2019-07-26

## 2019-06-17 NOTE — TELEPHONE ENCOUNTER
Zoloft  Routing refill request to provider for review/approval because:  Patient needs to be seen because:  Overdue for mood follow up    Nini Luevano RN, BSN

## 2019-06-17 NOTE — TELEPHONE ENCOUNTER
Prescription approved per [facility] Refill Protocol.  Jeancarlos Guy, RN, BSN

## 2019-07-26 DIAGNOSIS — F52.4 PREMATURE EJACULATION: ICD-10-CM

## 2019-07-26 DIAGNOSIS — R45.4 ANGER: ICD-10-CM

## 2019-07-26 DIAGNOSIS — F32.4 MAJOR DEPRESSIVE DISORDER WITH SINGLE EPISODE, IN PARTIAL REMISSION (H): ICD-10-CM

## 2019-07-29 RX ORDER — SERTRALINE HYDROCHLORIDE 100 MG/1
TABLET, FILM COATED ORAL
Qty: 15 TABLET | Refills: 0 | Status: SHIPPED | OUTPATIENT
Start: 2019-07-29 | End: 2019-09-05

## 2019-07-29 NOTE — TELEPHONE ENCOUNTER
Zoloft  Routing refill request to provider for review/approval because:  Echo given x1 and patient did not follow up, please advise  Patient needs to be seen because:  Mood follow up    Nini Luevano, RN, BSN

## 2019-09-01 DIAGNOSIS — G89.29 CHRONIC LEFT-SIDED LOW BACK PAIN WITHOUT SCIATICA: ICD-10-CM

## 2019-09-01 DIAGNOSIS — R45.4 ANGER: ICD-10-CM

## 2019-09-01 DIAGNOSIS — M54.50 CHRONIC LEFT-SIDED LOW BACK PAIN WITHOUT SCIATICA: ICD-10-CM

## 2019-09-01 DIAGNOSIS — F52.4 PREMATURE EJACULATION: ICD-10-CM

## 2019-09-01 DIAGNOSIS — F32.4 MAJOR DEPRESSIVE DISORDER WITH SINGLE EPISODE, IN PARTIAL REMISSION (H): ICD-10-CM

## 2019-09-03 NOTE — TELEPHONE ENCOUNTER
Flexeril  Routing refill request to provider for review/approval because:  Drug not on the [facility] refill protocol     Last Written Prescription Date: 10/15/18  Last Fill Quantity: 90,  # refills: 1   Last office visit: 10/15/2018 with prescribing provider:     Future Office Visit:      Nini Luevano RN, BSN

## 2019-09-03 NOTE — TELEPHONE ENCOUNTER
Routing refill request to provider for review/approval because:  Echo given x2 and patient did not follow up, please advise    Last ov 11/12/2018    Jeancarlos Guy, RN, BSN

## 2019-09-05 ENCOUNTER — VIRTUAL VISIT (OUTPATIENT)
Dept: FAMILY MEDICINE | Facility: OTHER | Age: 29
End: 2019-09-05
Payer: COMMERCIAL

## 2019-09-05 DIAGNOSIS — R45.4 ANGER: ICD-10-CM

## 2019-09-05 DIAGNOSIS — F32.4 MAJOR DEPRESSIVE DISORDER WITH SINGLE EPISODE, IN PARTIAL REMISSION (H): Primary | ICD-10-CM

## 2019-09-05 DIAGNOSIS — F52.4 PREMATURE EJACULATION: ICD-10-CM

## 2019-09-05 PROCEDURE — 99441 ZZC PHYSICIAN TELEPHONE EVALUATION 5-10 MIN: CPT | Performed by: FAMILY MEDICINE

## 2019-09-05 RX ORDER — SERTRALINE HYDROCHLORIDE 100 MG/1
150 TABLET, FILM COATED ORAL DAILY
Qty: 45 TABLET | Refills: 1 | Status: SHIPPED | OUTPATIENT
Start: 2019-09-05 | End: 2019-10-17

## 2019-09-05 ASSESSMENT — ANXIETY QUESTIONNAIRES
5. BEING SO RESTLESS THAT IT IS HARD TO SIT STILL: SEVERAL DAYS
1. FEELING NERVOUS, ANXIOUS, OR ON EDGE: SEVERAL DAYS
6. BECOMING EASILY ANNOYED OR IRRITABLE: MORE THAN HALF THE DAYS
3. WORRYING TOO MUCH ABOUT DIFFERENT THINGS: SEVERAL DAYS
2. NOT BEING ABLE TO STOP OR CONTROL WORRYING: SEVERAL DAYS
IF YOU CHECKED OFF ANY PROBLEMS ON THIS QUESTIONNAIRE, HOW DIFFICULT HAVE THESE PROBLEMS MADE IT FOR YOU TO DO YOUR WORK, TAKE CARE OF THINGS AT HOME, OR GET ALONG WITH OTHER PEOPLE: SOMEWHAT DIFFICULT
7. FEELING AFRAID AS IF SOMETHING AWFUL MIGHT HAPPEN: SEVERAL DAYS
GAD7 TOTAL SCORE: 9

## 2019-09-05 ASSESSMENT — PATIENT HEALTH QUESTIONNAIRE - PHQ9
5. POOR APPETITE OR OVEREATING: MORE THAN HALF THE DAYS
SUM OF ALL RESPONSES TO PHQ QUESTIONS 1-9: 12

## 2019-09-05 NOTE — TELEPHONE ENCOUNTER
Left message to return call. Patient is overdue for follow up. Once scheduled, Dr Wade can refill his medication up to his visit. Please assist with scheduling.    Luci Zambrano MA

## 2019-09-05 NOTE — PROGRESS NOTES
"Sudhakar Abdalla is a 29 year old male who is being evaluated via a billable telephone visit.      The patient has been notified of following:     \"This telephone visit will be conducted via a call between you and your physician/provider. We have found that certain health care needs can be provided without the need for a physical exam.  This service lets us provide the care you need with a short phone conversation.  If a prescription is necessary we can send it directly to your pharmacy.  If lab work is needed we can place an order for that and you can then stop by our lab to have the test done at a later time.    If during the course of the call the physician/provider feels a telephone visit is not appropriate, you will not be charged for this service.\"     Consent has been obtained for this service by 1 care team member: yes. See the scanned image in the medical record.    Sudhakar Abdalla complains of    Chief Complaint   Patient presents with     Depression       I have reviewed and updated the patient's Past Medical History, Social History, Family History and Medication List.    ALLERGIES  Patient has no known allergies.    Rochelle Alicia MA    (MA signature)    Additional provider notes:   [place].  Missed some appointments.  He does note that his mood is not as bad as it was, but still not good.  Currently taking sertraline 100 mg daily.  Does have some episodes of rapid worsening of his mood at times.  Notes that this is better than it used to be but getting worse lately.      Denies side effects from the medications.      PHQ-9 SCORE 6/21/2018 10/15/2018 9/5/2019   PHQ-9 Total Score MyChart 9 (Mild depression) - -   PHQ-9 Total Score 9 14 12   PHQ9 today is 12 with GAD7 of 9.      Assessment/Plan:    ICD-10-CM    1. Major depressive disorder with single episode, in partial remission (H) F32.4 sertraline (ZOLOFT) 100 MG tablet   2. Anger R45.4 sertraline (ZOLOFT) 100 MG tablet   3. " Premature ejaculation F52.4 sertraline (ZOLOFT) 100 MG tablet      Patient has had some improvement in his mood and anger, but is still not where he should be.  We will go ahead and increase his dose up to 150 mg daily.  We discussed some other options with patient, but this is the route he wanted to try.  He is to let us know if any side effects or lack of improvement over the next month.  Follow-up in 1 month to ensure adequate improvement in response.  If he is not responding to this, we could consider some mood stabilizers in addition to the sertraline.  But there could certainly be some bipolar disease, but I am not convinced of that at this time.    Portions of this note were completed using Dragon dictation software.  Although reviewed, there may be typographical and other inadvertent errors that remain.       I have reviewed the note as documented above.  This accurately captures the substance of my conversation with the patient.  Sudhakar Abdalla     Total time of call between patient and provider was 5 minutes     Delbert Wade MD, MD

## 2019-09-06 RX ORDER — CYCLOBENZAPRINE HCL 10 MG
TABLET ORAL
Qty: 90 TABLET | Refills: 1 | OUTPATIENT
Start: 2019-09-06

## 2019-09-06 RX ORDER — SERTRALINE HYDROCHLORIDE 100 MG/1
TABLET, FILM COATED ORAL
Qty: 15 TABLET | Refills: 0 | OUTPATIENT
Start: 2019-09-06

## 2019-09-06 ASSESSMENT — ANXIETY QUESTIONNAIRES: GAD7 TOTAL SCORE: 9

## 2019-10-03 ENCOUNTER — HEALTH MAINTENANCE LETTER (OUTPATIENT)
Age: 29
End: 2019-10-03

## 2019-10-14 ENCOUNTER — TELEPHONE (OUTPATIENT)
Dept: FAMILY MEDICINE | Facility: OTHER | Age: 29
End: 2019-10-14

## 2019-10-14 NOTE — TELEPHONE ENCOUNTER
Reason for Call:  Same Day Appointment, Requested Provider:  Delbert Wade MD    PCP: Delbert Wade    Reason for visit:  Patient requesting a phone visit for follow up of sertraline.  He should be seen in clinic once a year which his last visit was 10-15-18.  He states he works out of town and is unable to come in for an appt in the next week or two and would like to do a phone visit.  Please advise.  Thank  You.     Duration of symptoms: ongoing    Have you been treated for this in the past? Yes    Additional comments: none    Can we leave a detailed message on this number? YES    Phone number patient can be reached at: Cell number on file:    Telephone Information:   Mobile [phone]       Best Time: any    Call taken on 10/14/2019 at 1:45 PM by Ginette Lopez

## 2019-10-15 NOTE — TELEPHONE ENCOUNTER
OK for phone visit this week, but needs to be seen before any further refills after this one (with the phone visit).

## 2019-10-15 NOTE — PROGRESS NOTES
"Sudhakar Abdalla is a 29 year old male who is being evaluated via a billable telephone visit.      The patient has been notified of following:     \"This telephone visit will be conducted via a call between you and your physician/provider. We have found that certain health care needs can be provided without the need for a physical exam.  This service lets us provide the care you need with a short phone conversation.  If a prescription is necessary we can send it directly to your pharmacy.  If lab work is needed we can place an order for that and you can then stop by our lab to have the test done at a later time.    If during the course of the call the physician/provider feels a telephone visit is not appropriate, you will not be charged for this service.\"     Consent has been obtained for this service by 1 care team member: yes. See the scanned image in the medical record.    Sudhakar Abdalla complains of    Chief Complaint   Patient presents with     Recheck Medication       I have reviewed and updated the patient's Past Medical History, Social History, Family History and Medication List.    ALLERGIES  Patient has no known allergies.    Rochelle Alicia MA    (MA signature)    PHQ-9 SCORE 10/15/2018 9/5/2019 10/17/2019   PHQ-9 Total Score MyChart - - -   PHQ-9 Total Score 14 12 5     CARIDAD-7 SCORE 10/15/2018 9/5/2019 10/17/2019   Total Score - - -   Total Score 7 9 8           Additional provider notes:   Pt ready to follow up on mood.  He's doing some [employer] work out of town right now.      He feels his mood is pretty good.  Denies significant side effects from the medication.  Perhaps a little more heartburn.      Some persistent anxiety.  Just got some additional responsibilities at work.      Assessment/Plan:    ICD-10-CM    1. Major depressive disorder with single episode, in partial remission (H) F32.4 sertraline (ZOLOFT) 100 MG tablet   2. Anger R45.4 sertraline (ZOLOFT) 100 MG tablet   3. Chronic left-sided low " back pain without sciatica M54.5 cyclobenzaprine (FLEXERIL) 10 MG tablet    G89.29    4. Premature ejaculation F52.4 sertraline (ZOLOFT) 100 MG tablet      1, 2.  Relatively good control.  No concerning side effects.  Discussed option of continuing current dose versus slight increase.  Patient wished to continue with current dosing.  Follow-up in 1 month.  If still not under ideal control, consider further increase in his dose.  3.  Relatively stable.  Patient uses Flexeril on occasion to help with this.  This was renewed today.    Portions of this note were completed using Dragon dictation software.  Although reviewed, there may be typographical and other inadvertent errors that remain.         I have reviewed the note as documented above.  This accurately captures the substance of my conversation with the patient.  Sudhakar Abdalla     Total time of call between patient and provider was 6 minutes     Delbert Wade MD, MD

## 2019-10-17 ENCOUNTER — VIRTUAL VISIT (OUTPATIENT)
Dept: FAMILY MEDICINE | Facility: OTHER | Age: 29
End: 2019-10-17
Payer: COMMERCIAL

## 2019-10-17 DIAGNOSIS — G89.29 CHRONIC LEFT-SIDED LOW BACK PAIN WITHOUT SCIATICA: ICD-10-CM

## 2019-10-17 DIAGNOSIS — F32.4 MAJOR DEPRESSIVE DISORDER WITH SINGLE EPISODE, IN PARTIAL REMISSION (H): Primary | ICD-10-CM

## 2019-10-17 DIAGNOSIS — M54.50 CHRONIC LEFT-SIDED LOW BACK PAIN WITHOUT SCIATICA: ICD-10-CM

## 2019-10-17 DIAGNOSIS — F52.4 PREMATURE EJACULATION: ICD-10-CM

## 2019-10-17 DIAGNOSIS — R45.4 ANGER: ICD-10-CM

## 2019-10-17 PROCEDURE — 99441 ZZC PHYSICIAN TELEPHONE EVALUATION 5-10 MIN: CPT | Performed by: FAMILY MEDICINE

## 2019-10-17 RX ORDER — CYCLOBENZAPRINE HCL 10 MG
10 TABLET ORAL 3 TIMES DAILY PRN
Qty: 90 TABLET | Refills: 1 | Status: SHIPPED | OUTPATIENT
Start: 2019-10-17 | End: 2019-11-29

## 2019-10-17 RX ORDER — SERTRALINE HYDROCHLORIDE 100 MG/1
150 TABLET, FILM COATED ORAL DAILY
Qty: 45 TABLET | Refills: 1 | Status: SHIPPED | OUTPATIENT
Start: 2019-10-17 | End: 2019-11-29

## 2019-10-17 ASSESSMENT — PATIENT HEALTH QUESTIONNAIRE - PHQ9
5. POOR APPETITE OR OVEREATING: SEVERAL DAYS
SUM OF ALL RESPONSES TO PHQ QUESTIONS 1-9: 5

## 2019-10-17 ASSESSMENT — ANXIETY QUESTIONNAIRES
GAD7 TOTAL SCORE: 8
7. FEELING AFRAID AS IF SOMETHING AWFUL MIGHT HAPPEN: SEVERAL DAYS
3. WORRYING TOO MUCH ABOUT DIFFERENT THINGS: MORE THAN HALF THE DAYS
5. BEING SO RESTLESS THAT IT IS HARD TO SIT STILL: NOT AT ALL
1. FEELING NERVOUS, ANXIOUS, OR ON EDGE: SEVERAL DAYS
2. NOT BEING ABLE TO STOP OR CONTROL WORRYING: MORE THAN HALF THE DAYS
6. BECOMING EASILY ANNOYED OR IRRITABLE: SEVERAL DAYS

## 2019-10-18 ASSESSMENT — ANXIETY QUESTIONNAIRES: GAD7 TOTAL SCORE: 8

## 2019-11-29 ENCOUNTER — OFFICE VISIT (OUTPATIENT)
Dept: FAMILY MEDICINE | Facility: OTHER | Age: 29
End: 2019-11-29
Payer: COMMERCIAL

## 2019-11-29 VITALS
SYSTOLIC BLOOD PRESSURE: 116 MMHG | OXYGEN SATURATION: 99 % | HEART RATE: 72 BPM | RESPIRATION RATE: 16 BRPM | TEMPERATURE: 97.4 F | BODY MASS INDEX: 23.16 KG/M2 | HEIGHT: 65 IN | DIASTOLIC BLOOD PRESSURE: 72 MMHG | WEIGHT: 139 LBS

## 2019-11-29 DIAGNOSIS — G89.29 CHRONIC LEFT-SIDED LOW BACK PAIN WITHOUT SCIATICA: ICD-10-CM

## 2019-11-29 DIAGNOSIS — F32.4 MAJOR DEPRESSIVE DISORDER WITH SINGLE EPISODE, IN PARTIAL REMISSION (H): ICD-10-CM

## 2019-11-29 DIAGNOSIS — R45.4 ANGER: ICD-10-CM

## 2019-11-29 DIAGNOSIS — M54.50 CHRONIC LEFT-SIDED LOW BACK PAIN WITHOUT SCIATICA: ICD-10-CM

## 2019-11-29 DIAGNOSIS — R10.13 ABDOMINAL PAIN, EPIGASTRIC: ICD-10-CM

## 2019-11-29 DIAGNOSIS — F52.4 PREMATURE EJACULATION: ICD-10-CM

## 2019-11-29 PROCEDURE — 99214 OFFICE O/P EST MOD 30 MIN: CPT | Performed by: STUDENT IN AN ORGANIZED HEALTH CARE EDUCATION/TRAINING PROGRAM

## 2019-11-29 PROCEDURE — 96127 BRIEF EMOTIONAL/BEHAV ASSMT: CPT | Performed by: STUDENT IN AN ORGANIZED HEALTH CARE EDUCATION/TRAINING PROGRAM

## 2019-11-29 RX ORDER — SERTRALINE HYDROCHLORIDE 100 MG/1
150 TABLET, FILM COATED ORAL DAILY
Qty: 135 TABLET | Refills: 0 | Status: SHIPPED | OUTPATIENT
Start: 2019-11-29 | End: 2020-02-27

## 2019-11-29 RX ORDER — CYCLOBENZAPRINE HCL 10 MG
10 TABLET ORAL 3 TIMES DAILY PRN
Qty: 90 TABLET | Refills: 1 | Status: SHIPPED | OUTPATIENT
Start: 2019-11-29 | End: 2020-08-20

## 2019-11-29 RX ORDER — FAMOTIDINE 20 MG/1
20 TABLET, FILM COATED ORAL 2 TIMES DAILY
Qty: 60 TABLET | Refills: 2 | Status: SHIPPED | OUTPATIENT
Start: 2019-11-29 | End: 2020-05-08

## 2019-11-29 ASSESSMENT — ANXIETY QUESTIONNAIRES
7. FEELING AFRAID AS IF SOMETHING AWFUL MIGHT HAPPEN: SEVERAL DAYS
3. WORRYING TOO MUCH ABOUT DIFFERENT THINGS: MORE THAN HALF THE DAYS
5. BEING SO RESTLESS THAT IT IS HARD TO SIT STILL: SEVERAL DAYS
2. NOT BEING ABLE TO STOP OR CONTROL WORRYING: SEVERAL DAYS
1. FEELING NERVOUS, ANXIOUS, OR ON EDGE: SEVERAL DAYS
GAD7 TOTAL SCORE: 9
6. BECOMING EASILY ANNOYED OR IRRITABLE: MORE THAN HALF THE DAYS
4. TROUBLE RELAXING: SEVERAL DAYS
7. FEELING AFRAID AS IF SOMETHING AWFUL MIGHT HAPPEN: SEVERAL DAYS
GAD7 TOTAL SCORE: 9
GAD7 TOTAL SCORE: 9

## 2019-11-29 ASSESSMENT — PATIENT HEALTH QUESTIONNAIRE - PHQ9
10. IF YOU CHECKED OFF ANY PROBLEMS, HOW DIFFICULT HAVE THESE PROBLEMS MADE IT FOR YOU TO DO YOUR WORK, TAKE CARE OF THINGS AT HOME, OR GET ALONG WITH OTHER PEOPLE: SOMEWHAT DIFFICULT
SUM OF ALL RESPONSES TO PHQ QUESTIONS 1-9: 8
SUM OF ALL RESPONSES TO PHQ QUESTIONS 1-9: 8

## 2019-11-29 ASSESSMENT — MIFFLIN-ST. JEOR: SCORE: 1526.12

## 2019-11-29 NOTE — PROGRESS NOTES
Subjective     Sudhakar Abdalla is a 29 year old male who presents to clinic today for the following health issues:    History of Present Illness        Mental Health Follow-up:  Patient presents to follow-up on Depression.Status since last visit:: Fair.  The patient is not having other symptoms associated with depression.      Any significant life events: No  Patient is feeling anxious or having panic attacks.  Patient has no concerns about alcohol or drug use.     Social History  Tobacco Use    Smoking status: Current Every Day Smoker      Packs/day: 1.00      Years: 12.00      Pack years: 12      Types: Cigarettes      Start date: 8/24/2004    Smokeless tobacco: Former User  Alcohol use: Not Currently    Alcohol/week: 0.0 standard drinks  Drug use: Yes    Types: Marijuana    Comment: marijuana almost daily       Today's PHQ-9         PHQ-9 Total Score:     (P) 8   PHQ-9 Q9 Thoughts of better off dead/self-harm past 2 weeks :   (P) Not at all   Thoughts of suicide or self harm:      Self-harm Plan:        Self-harm Action:          Safety concerns for self or others:           He eats 0-1 servings of fruits and vegetables daily.He consumes 5 sweetened beverage(s) daily.  He is taking medications regularly.     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 He says that overall he feels like his depression and anxiety symptoms are better controlled with increased dose of sertraline.     Answers for HPI/ROS submitted by the patient on 11/29/2019   Chronic problems general questions HPI Form  If you checked off any problems, how difficult have these problems made it for you to do your work, take care of things at home, or get along with other people?: Somewhat  difficult  PHQ9 TOTAL SCORE: 8  CARIDAD 7 TOTAL SCORE: 9    Medication Followup of Flexeril    Taking Medication as prescribed: yes    Side Effects:  None    Medication Helping Symptoms:  Little, not as much as when he started taking medication. 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 He uses ice or heat and stretched before and after work.        Medication Followup of ranitidine    Taking Medication as prescribed: yes    Side Effects:  None    Medication Helping Symptoms:  yes     Patient Active Problem List   Diagnosis     History of ADHD     Hx of migraines     Hx of scoliosis     Tobacco use disorder     Chronic bilateral low back pain with bilateral sciatica     Anger     Premature ejaculation     Segmental dysfunction of thoracic region     Segmental dysfunction of lumbar region     Segmental dysfunction of sacral region     Lumbago     Scoliosis     Major depressive disorder with single episode, in partial remission (H)     Past Surgical History:   Procedure Laterality Date     HEAD & NECK SURGERY  1999    progressive scoliosis       Social History     Tobacco Use     Smoking status: Current Every Day Smoker     Packs/day: 1.00     Years: 12.00     Pack years: 12.00     Types: Cigarettes     Start date: 8/24/2004     Smokeless tobacco: Former User   Substance Use Topics     Alcohol use: Not Currently     Alcohol/week: 0.0 standard drinks     Family History   Problem Relation Age of Onset     Diabetes Mother      Coronary Artery Disease Mother      Hypertension Mother      Cerebrovascular Disease Mother      Obesity Mother      Aneurysm Father      Alzheimer Disease Paternal Grandfather      Diabetes Sister          Current Outpatient Medications   Medication Sig Dispense Refill     Aspirin-Acetaminophen-Caffeine (EXCEDRIN PO) Take 500 mg by mouth 2 times daily as needed       cyclobenzaprine (FLEXERIL) 10 MG tablet Take 1 tablet (10 mg) by mouth 3 times daily as needed for muscle spasms 90  "tablet 1     famotidine (PEPCID) 20 MG tablet Take 1 tablet (20 mg) by mouth 2 times daily 60 tablet 2     ibuprofen (ADVIL/MOTRIN) 200 MG tablet Take 3 tablets (600 mg) by mouth every 6 hours as needed for pain or fever       ranitidine (ZANTAC) 150 MG tablet TAKE ONE TABLET BY MOUTH TWICE A DAY. 60 tablet 1     sertraline (ZOLOFT) 100 MG tablet Take 1.5 tablets (150 mg) by mouth daily 135 tablet 0     No Known Allergies  BP Readings from Last 3 Encounters:   11/29/19 116/72   03/20/19 114/75   12/04/18 (!) 127/98    Wt Readings from Last 3 Encounters:   11/29/19 63 kg (139 lb)   10/15/18 57.1 kg (125 lb 12.8 oz)   09/27/18 59 kg (130 lb)                    Reviewed and updated as needed this visit by Provider         Review of Systems   ROS COMP: Constitutional, HEENT, cardiovascular, pulmonary, gi and gu systems are negative, except as otherwise noted.      Objective    /72   Pulse 72   Temp 97.4  F (36.3  C) (Temporal)   Resp 16   Ht 1.641 m (5' 4.61\")   Wt 63 kg (139 lb)   SpO2 99%   BMI 23.41 kg/m    Body mass index is 23.41 kg/m .  Physical Exam   GENERAL: healthy, alert and no distress  RESP: lungs clear to auscultation - no rales, rhonchi or wheezes  CV: regular rate and rhythm, normal S1 S2, no S3 or S4, no murmur, click or rub, no peripheral edema and peripheral pulses strong  MS: no gross musculoskeletal defects noted, no edema  SKIN: no suspicious lesions or rashes  NEURO: Normal strength and tone, mentation intact and speech normal  PSYCH: mentation appears normal, affect normal/bright    Diagnostic Test Results:  Labs reviewed in Epic        Assessment & Plan     1. Major depressive disorder with single episode, in partial remission (H)  Improved. Continue current medication. Follow up in 3 months - if stable can do e-visit or if worsened then recommend office visit.   - sertraline (ZOLOFT) 100 MG tablet; Take 1.5 tablets (150 mg) by mouth daily  Dispense: 135 tablet; Refill: 0    2. " Anger  Improved. Still has frustrations with increased responsibilities with work but feels he is managing okay.   - sertraline (ZOLOFT) 100 MG tablet; Take 1.5 tablets (150 mg) by mouth daily  Dispense: 135 tablet; Refill: 0    3. 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 Recommended he work on core strengthening exercises to support his low back, continue to use ice or heat, stretch before and after work. Follow up as needed.   - cyclobenzaprine (FLEXERIL) 10 MG tablet; Take 1 tablet (10 mg) by mouth 3 times daily as needed for muscle spasms  Dispense: 90 tablet; Refill: 1    4. Premature ejaculation  - sertraline (ZOLOFT) 100 MG tablet; Take 1.5 tablets (150 mg) by mouth daily  Dispense: 135 tablet; Refill: 0    5. Abdominal pain, epigastric  Improved. Will switched to famotidine due to ranitidine recall.  - famotidine (PEPCID) 20 MG tablet; Take 1 tablet (20 mg) by mouth 2 times daily  Dispense: 60 tablet; Refill: 2     Tobacco Cessation:   reports that he has been smoking cigarettes. He started smoking about 15 years ago. He has a 12.00 pack-year smoking history. He has quit using smokeless tobacco.  Tobacco Cessation Action Plan: Information offered: Patient not interested at this time    No follow-ups on file.    ROSA Vidal [facility]

## 2019-11-30 ASSESSMENT — ANXIETY QUESTIONNAIRES: GAD7 TOTAL SCORE: 9

## 2019-11-30 ASSESSMENT — PATIENT HEALTH QUESTIONNAIRE - PHQ9: SUM OF ALL RESPONSES TO PHQ QUESTIONS 1-9: 8

## 2020-01-23 ENCOUNTER — OFFICE VISIT (OUTPATIENT)
Dept: URGENT CARE | Facility: RETAIL CLINIC | Age: 30
End: 2020-01-23
Payer: COMMERCIAL

## 2020-01-23 VITALS
SYSTOLIC BLOOD PRESSURE: 125 MMHG | OXYGEN SATURATION: 99 % | HEART RATE: 106 BPM | DIASTOLIC BLOOD PRESSURE: 88 MMHG | TEMPERATURE: 98.3 F

## 2020-01-23 DIAGNOSIS — H65.192 OTHER NON-RECURRENT ACUTE NONSUPPURATIVE OTITIS MEDIA OF LEFT EAR: Primary | ICD-10-CM

## 2020-01-23 PROCEDURE — 99213 OFFICE O/P EST LOW 20 MIN: CPT | Performed by: INTERNAL MEDICINE

## 2020-01-23 NOTE — PROGRESS NOTES
[facility] Care Progress Note        Kelli Sánchez MD, MPH  01/23/2020        History:      Sudhakar Abdalla is a pleasant 29 year old year old male with a chief complaint of left ear pain/pressure/muffled since 2 days ago.   No fever or chills.   No dyspnea or wheezing.   No headache or neck pain.  No GI or  symptoms.   No MSK symptoms.         Assessment and Plan:         non-recurrent acute nonsuppurative otitis media of left ear    - amoxicillin-clavulanate (AUGMENTIN) 875-125 MG tablet; Take 1 tablet by mouth 2 times daily for 10 days  Dispense: 20 tablet; Refill: 0  Discussed supportive care with the patient  Advised to increase fluid intake and rest  Tylenol 650 mg PO for pain/fever q 6 hours as needed.  F/u w PCP in 4-5 days, earlier if symptoms worsen.                   Physical Exam:      /88   Pulse 106   Temp 98.3  F (36.8  C) (Oral)   SpO2 99%      Constitutional: Patient is in no distress The patient is pleasant and cooperative.   HEENT: Head:  Head is atraumatic, normocephalic.    Eyes: Pupils are equal, round and reactive to light and accomodation.  Sclera is non-icteric. No conjunctival injection, or exudate noted. Extraocular motion is intact. Visual acuity is intact bilaterally.  Ears:  External acoustic canals are patent and clear.  There is erythema and bulging of the ( Left ) tympanic membrane. Right TM is normal on exam.   Nose:  Nasal congestion w/o drainage or mucosal ulceration is noted.  Throat:  Oral mucosa is moist.  No oral lesions are noted. Posterior pharyngeal hyperemia w/o exudate noted.     Neck Supple.  There is no cervical lymphadenopathy.  No nuchal rigidity noted.  There is no meningismus.     Cardiovascular: Heart is regular to rate and rhythm.  No murmur is noted.     Lungs: Clear in the anterior and posterior pulmonary fields.   Abdomen: Soft and non-tender.    Back No flank tenderness is noted.   Extremeties No edema, no calf tenderness.    Neuro: No focal deficit.   Skin No petechiae or purpura is noted.  There is no rash.   Mood Normal              Data:      All new lab and imaging data was reviewed.   No results found for any visits on 01/23/20.

## 2020-02-27 DIAGNOSIS — F32.4 MAJOR DEPRESSIVE DISORDER WITH SINGLE EPISODE, IN PARTIAL REMISSION (H): ICD-10-CM

## 2020-02-27 DIAGNOSIS — F52.4 PREMATURE EJACULATION: ICD-10-CM

## 2020-02-27 DIAGNOSIS — R45.4 ANGER: ICD-10-CM

## 2020-02-27 RX ORDER — SERTRALINE HYDROCHLORIDE 100 MG/1
150 TABLET, FILM COATED ORAL DAILY
Qty: 20 TABLET | Refills: 0 | Status: SHIPPED | OUTPATIENT
Start: 2020-02-27 | End: 2020-08-20 | Stop reason: DRUGHIGH

## 2020-02-27 NOTE — TELEPHONE ENCOUNTER
Reason for Call:  Medication or medication refill:    Do you use a [facility]?  Name of the pharmacy and phone number for the current request:  [facility]- [phone]    Name of the medication requested: SERTRALINE    Other request: Patient's wife called clinic. Patient needs a refill on his medication, and patient also wants to know if he needs an appointment to get medication dose increased. Patient does not have an active phone so that is why wife called. Patient will call to check I later this afternoon.     Can we leave a detailed message on this number? YES    Phone number patient can be reached at: NONE    Best Time: --    Call taken on 2/27/2020 at 12:42 PM by Vivi Velasquez

## 2020-02-27 NOTE — PROGRESS NOTES
Subjective     Sudhakar Abdalla is a 29 year old male who presents to clinic today for the following health issues:    History of Present Illness        Mental Health Follow-up:  Patient presents to follow-up on Depression (Depression no change, but anger has worsened).Patient's depression since last visit has been:  Medium  The patient is not having other symptoms associated with depression.      Any significant life events: No  Patient is not feeling anxious or having panic attacks.  Patient has no concerns about alcohol or drug use.     Social History  Tobacco Use    Smoking status: Current Every Day Smoker      Packs/day: 1.00      Years: 12.00      Pack years: 12      Types: Cigarettes      Start date: 8/24/2004    Smokeless tobacco: Former User  Alcohol use: Not Currently    Alcohol/week: 0.0 standard drinks  Drug use: Yes    Types: Marijuana    Comment: marijuana almost daily       Today's PHQ-9         PHQ-9 Total Score:     (P) 8   PHQ-9 Q9 Thoughts of better off dead/self-harm past 2 weeks :   (P) Not at all   Thoughts of suicide or self harm:      Self-harm Plan:        Self-harm Action:          Safety concerns for self or others:           He eats 0-1 servings of fruits and vegetables daily.He consumes 6 sweetened beverage(s) daily.He exercises with enough effort to increase his heart rate 60 or more minutes per day.  He exercises with enough effort to increase his heart rate 4 days per week.   He is taking medications regularly.     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 Feeling more angry and irritable. He would like to increase dose to 200 mg of sertraline as he did better after increasing the dose last fall.       Answers for HPI/ROS submitted by the patient on 3/2/2020   Chronic problems general questions HPI Form  If you checked off any problems, how difficult have these problems made it for you to do your work, take care of things at home, or get along with other people?:  Somewhat difficult  PHQ9 TOTAL SCORE: 8  CARIDAD 7 TOTAL SCORE: 6    PHQ 10/17/2019 11/29/2019 3/2/2020   PHQ-9 Total Score 5 8 8   Q9: Thoughts of better off dead/self-harm past 2 weeks Not at all Not at all Not at all     CARIDAD-7 SCORE 10/17/2019 11/29/2019 3/2/2020   Total Score - 9 (mild anxiety) 6 (mild anxiety)   Total Score 8 9 6         Patient Active Problem List   Diagnosis     History of ADHD     Hx of migraines     Hx of scoliosis     Tobacco use disorder     Chronic bilateral low back pain with bilateral sciatica     Anger     Premature ejaculation     Segmental dysfunction of thoracic region     Segmental dysfunction of lumbar region     Segmental dysfunction of sacral region     Lumbago     Scoliosis     Major depressive disorder with single episode, in partial remission (H)     Past Surgical History:   Procedure Laterality Date     HEAD & NECK SURGERY  1999    progressive scoliosis       Social History     Tobacco Use     Smoking status: Current Every Day Smoker     Packs/day: 1.00     Years: 12.00     Pack years: 12.00     Types: Cigarettes     Start date: 8/24/2004     Smokeless tobacco: Former User   Substance Use Topics     Alcohol use: Not Currently     Alcohol/week: 0.0 standard drinks     Family History   Problem Relation Age of Onset     Diabetes Mother      Coronary Artery Disease Mother      Hypertension Mother      Cerebrovascular Disease Mother      Obesity Mother      Aneurysm Father      Alzheimer Disease Paternal Grandfather      Diabetes Sister          Current Outpatient Medications   Medication Sig Dispense Refill     Aspirin-Acetaminophen-Caffeine (EXCEDRIN PO) Take 500 mg by mouth 2 times daily as needed       cyclobenzaprine (FLEXERIL) 10 MG tablet Take 1 tablet (10 mg) by mouth 3 times daily as needed for muscle spasms 90 tablet 1     famotidine (PEPCID) 20 MG tablet Take 1 tablet (20 mg) by mouth 2 times daily 60 tablet 2     ibuprofen (ADVIL/MOTRIN) 200 MG tablet Take 3 tablets (600  "mg) by mouth every 6 hours as needed for pain or fever       sertraline (ZOLOFT) 100 MG tablet Take 2 tablets (200 mg) by mouth daily 180 tablet 0     sertraline (ZOLOFT) 100 MG tablet Take 1.5 tablets (150 mg) by mouth daily 20 tablet 0     ranitidine (ZANTAC) 150 MG tablet TAKE ONE TABLET BY MOUTH TWICE A DAY. (Patient not taking: Reported on 3/2/2020) 60 tablet 1     No Known Allergies  BP Readings from Last 3 Encounters:   03/02/20 112/72   01/23/20 125/88   11/29/19 116/72    Wt Readings from Last 3 Encounters:   03/02/20 65.1 kg (143 lb 8 oz)   11/29/19 63 kg (139 lb)   10/15/18 57.1 kg (125 lb 12.8 oz)                    Reviewed and updated as needed this visit by Provider         Review of Systems   ROS COMP: Constitutional, HEENT, cardiovascular, pulmonary, gi and gu systems are negative, except as otherwise noted.      Objective    /72   Pulse 88   Temp 98.5  F (36.9  C) (Temporal)   Resp 16   Ht 1.644 m (5' 4.72\")   Wt 65.1 kg (143 lb 8 oz)   SpO2 97%   BMI 24.08 kg/m    Body mass index is 24.08 kg/m .  Physical Exam   GENERAL: alert and no distress  MS: no gross musculoskeletal defects noted, no edema  NEURO: Normal strength and tone, mentation intact and speech normal  PSYCH: mentation appears normal, affect flat with occasional smiles, judgement and insight intact and appearance well groomed    Diagnostic Test Results:  Labs reviewed in Epic        Assessment & Plan     1. Moderate episode of recurrent major depressive disorder (H)  Depression is doing okay but anger and irritability worsened.  He would like to increase dose of sertraline to 200 mg daily to see if this helps.  He is also pursuing counseling and will check and see if his insurance covers this.  Follow-up in 3 months with telephone or office visit.    - sertraline (ZOLOFT) 100 MG tablet; Take 2 tablets (200 mg) by mouth daily  Dispense: 180 tablet; Refill: 0  - MENTAL HEALTH REFERRAL  - Adult; Outpatient Treatment; " Individual/Couples/Family/Group Therapy/Health Psychology; [facility]: [facility] [phone]; We will contact you to schedule the appointment or please call with any questions    2. Feeling angry  See note #1  - MENTAL HEALTH REFERRAL  - Adult; Outpatient Treatment; Individual/Couples/Family/Group Therapy/Health Psychology; [facility]: [facility] [phone]; We will contact you to schedule the appointment or please call with any questions    3. Tobacco dependence syndrome  Not interested in quitting smoking.   - TOBACCO CESSATION ORDER FOR     No follow-ups on file.    ROSA Vidal [facility]

## 2020-02-27 NOTE — TELEPHONE ENCOUNTER
Patient is calling back.  He now has a working phone. [phone].   He is scheduled for a follow up of below medication on Monday 03-02-20 to see  Molly Jones.  He is out of medication and is wondering if he can get a refill or enough medication until he is seen on Monday?  Please advise.  Thank you

## 2020-03-02 ENCOUNTER — OFFICE VISIT (OUTPATIENT)
Dept: FAMILY MEDICINE | Facility: OTHER | Age: 30
End: 2020-03-02
Payer: COMMERCIAL

## 2020-03-02 VITALS
RESPIRATION RATE: 16 BRPM | DIASTOLIC BLOOD PRESSURE: 72 MMHG | WEIGHT: 143.5 LBS | HEART RATE: 88 BPM | HEIGHT: 65 IN | BODY MASS INDEX: 23.91 KG/M2 | SYSTOLIC BLOOD PRESSURE: 112 MMHG | TEMPERATURE: 98.5 F | OXYGEN SATURATION: 97 %

## 2020-03-02 DIAGNOSIS — F17.200 TOBACCO DEPENDENCE SYNDROME: ICD-10-CM

## 2020-03-02 DIAGNOSIS — R45.4 FEELING ANGRY: ICD-10-CM

## 2020-03-02 DIAGNOSIS — F33.1 MODERATE EPISODE OF RECURRENT MAJOR DEPRESSIVE DISORDER (H): Primary | ICD-10-CM

## 2020-03-02 PROCEDURE — 99213 OFFICE O/P EST LOW 20 MIN: CPT | Performed by: STUDENT IN AN ORGANIZED HEALTH CARE EDUCATION/TRAINING PROGRAM

## 2020-03-02 RX ORDER — SERTRALINE HYDROCHLORIDE 100 MG/1
200 TABLET, FILM COATED ORAL DAILY
Qty: 180 TABLET | Refills: 0 | Status: SHIPPED | OUTPATIENT
Start: 2020-03-02 | End: 2020-08-20

## 2020-03-02 ASSESSMENT — ANXIETY QUESTIONNAIRES
7. FEELING AFRAID AS IF SOMETHING AWFUL MIGHT HAPPEN: SEVERAL DAYS
GAD7 TOTAL SCORE: 6
2. NOT BEING ABLE TO STOP OR CONTROL WORRYING: NOT AT ALL
5. BEING SO RESTLESS THAT IT IS HARD TO SIT STILL: NOT AT ALL
6. BECOMING EASILY ANNOYED OR IRRITABLE: MORE THAN HALF THE DAYS
4. TROUBLE RELAXING: SEVERAL DAYS
7. FEELING AFRAID AS IF SOMETHING AWFUL MIGHT HAPPEN: SEVERAL DAYS
3. WORRYING TOO MUCH ABOUT DIFFERENT THINGS: SEVERAL DAYS
1. FEELING NERVOUS, ANXIOUS, OR ON EDGE: SEVERAL DAYS
GAD7 TOTAL SCORE: 6
GAD7 TOTAL SCORE: 6

## 2020-03-02 ASSESSMENT — MIFFLIN-ST. JEOR: SCORE: 1548.4

## 2020-03-03 ASSESSMENT — ANXIETY QUESTIONNAIRES: GAD7 TOTAL SCORE: 6

## 2020-03-03 ASSESSMENT — PATIENT HEALTH QUESTIONNAIRE - PHQ9: SUM OF ALL RESPONSES TO PHQ QUESTIONS 1-9: 8

## 2020-05-07 DIAGNOSIS — R10.13 ABDOMINAL PAIN, EPIGASTRIC: ICD-10-CM

## 2020-05-08 RX ORDER — FAMOTIDINE 20 MG/1
TABLET, FILM COATED ORAL
Qty: 180 TABLET | Refills: 2 | Status: SHIPPED | OUTPATIENT
Start: 2020-05-08 | End: 2020-08-20

## 2020-05-08 NOTE — TELEPHONE ENCOUNTER
Per med dispense record, he has filled script in March and April.     Prescription approved per [facility] Refill Protocol.    Nini Luevano, RN, BSN

## 2020-08-19 NOTE — PROGRESS NOTES
"Sudhakar Abdalla is a 30 year old male who is being evaluated via a billable telephone visit.      The patient has been notified of following:     \"This telephone visit will be conducted via a call between you and your physician/provider. We have found that certain health care needs can be provided without the need for a physical exam.  This service lets us provide the care you need with a short phone conversation.  If a prescription is necessary we can send it directly to your pharmacy.  If lab work is needed we can place an order for that and you can then stop by our lab to have the test done at a later time.    Telephone visits are billed at different rates depending on your insurance coverage. During this emergency period, for some insurers they may be billed the same as an in-person visit.  Please reach out to your insurance provider with any questions.    If during the course of the call the physician/provider feels a telephone visit is not appropriate, you will not be charged for this service.\"    Patient has given verbal consent for Telephone visit?  Yes    What phone number would you like to be contacted at? [phone] - [phone]    How would you like to obtain your AVS? Philip Grady     Sudhakar Abdalla is a 30 year old male who presents via phone visit today for the following health issues:    HPI    Depression and Anxiety Follow-Up    How are you doing with your depression since your last visit? \"I have my ups and downs\"    How are you doing with your anxiety since your last visit?  \"I have my ups and downs\"    Are you having other symptoms that might be associated with depression or anxiety? Yes:  \"mood swings, irritability\"    Have you had a significant life event? OTHER: moving     Do you have any concerns with your use of alcohol or other drugs? No  Fleeting suicidal thoughts after he ran out of medication. Denies intent or plan to harm himself.   He felt good as long as he was taking " the medication.  He is in the process of moving.  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    Social History     Tobacco Use     Smoking status: Current Every Day Smoker     Packs/day: 1.00     Years: 12.00     Pack years: 12.00     Types: Cigarettes     Start date: 8/24/2004     Smokeless tobacco: Former User   Substance Use Topics     Alcohol use: Not Currently     Alcohol/week: 0.0 standard drinks     Drug use: Yes     Types: Marijuana     Comment: marijuana almost daily      PHQ 11/29/2019 3/2/2020 8/20/2020   PHQ-9 Total Score 8 8 7   Q9: Thoughts of better off dead/self-harm past 2 weeks Not at all Not at all Several days     CARIDAD-7 SCORE 11/29/2019 3/2/2020 8/20/2020   Total Score 9 (mild anxiety) 6 (mild anxiety) -   Total Score 9 6 11     Last PHQ-9 8/20/2020   1.  Little interest or pleasure in doing things 0   2.  Feeling down, depressed, or hopeless 1   3.  Trouble falling or staying asleep, or sleeping too much 2   4.  Feeling tired or having little energy 2   5.  Poor appetite or overeating 0   6.  Feeling bad about yourself 1   7.  Trouble concentrating 0   8.  Moving slowly or restless 0   Q9: Thoughts of better off dead/self-harm past 2 weeks 1   PHQ-9 Total Score 7   Difficulty at work, home, or with people Somewhat difficult     CARIDAD-7  8/20/2020   1. Feeling nervous, anxious, or on edge 2   2. Not being able to stop or control worrying 1   3. Worrying too much about different things 2   4. Trouble relaxing 2   5. Being so restless that it is hard to sit still 1   6. Becoming easily annoyed or irritable 3   7. Feeling afraid, as if something awful might happen 0   CARIDAD-7 Total Score 11   If you checked any problems, how difficult have they made it for you to do your work, take care of things at home, or get along with other people? Somewhat difficult       Suicide Assessment Five-step Evaluation and Treatment (SAFE-T)          Review of Systems    Constitutional, HEENT, cardiovascular, pulmonary, gi and gu systems are negative, except as otherwise noted.       Objective          Vitals:  No vitals were obtained today due to virtual visit.    healthy, alert and no distress  PSYCH: Alert and oriented times 3; coherent speech, normal   rate and volume, able to articulate logical thoughts, able   to abstract reason, no tangential thoughts, no hallucinations   or delusions  His affect is normal  RESP: No cough, no audible wheezing, able to talk in full sentences  Remainder of exam unable to be completed due to telephone visits    No results found for this or any previous visit (from the past 24 hour(s)).        Assessment/Plan:    Assessment & Plan     Moderate episode of recurrent major depressive disorder (H)  Stable as long as he does not run out of the medication.  Follow-up in 6 months or sooner if needed.  - sertraline (ZOLOFT) 100 MG tablet; Take 2 tablets (200 mg) by mouth daily    Abdominal pain, epigastric  Stable.  Continue current medication.  - famotidine (PEPCID) 20 MG tablet; Take 1 tablet (20 mg) by mouth 2 times daily    Chronic left-sided low back pain without sciatica  Stable.  Continue current medication.  - cyclobenzaprine (FLEXERIL) 10 MG tablet; Take 1 tablet (10 mg) by mouth 3 times daily as needed for muscle spasms     Depression Screening Follow Up    PHQ 8/20/2020   PHQ-9 Total Score 7   Q9: Thoughts of better off dead/self-harm past 2 weeks Several days     Last PHQ-9 8/20/2020   1.  Little interest or pleasure in doing things 0   2.  Feeling down, depressed, or hopeless 1   3.  Trouble falling or staying asleep, or sleeping too much 2   4.  Feeling tired or having little energy 2   5.  Poor appetite or overeating 0   6.  Feeling bad about yourself 1   7.  Trouble concentrating 0   8.  Moving slowly or restless 0   Q9: Thoughts of better off dead/self-harm past 2 weeks 1   PHQ-9 Total Score 7   Difficulty at work, home, or with people  Somewhat difficult         No flowsheet data found.      Follow Up    Denies plan or intent to harm himself.  He does not have suicidal thoughts as long as he continues to take his medication.  Discussed the following ways the patient can remain in a safe environment:  be around others      No follow-ups on file.    ORSA Vidal [facility]    Phone call duration:  10 minutes

## 2020-08-20 ENCOUNTER — VIRTUAL VISIT (OUTPATIENT)
Dept: FAMILY MEDICINE | Facility: OTHER | Age: 30
End: 2020-08-20
Payer: COMMERCIAL

## 2020-08-20 DIAGNOSIS — G89.29 CHRONIC LEFT-SIDED LOW BACK PAIN WITHOUT SCIATICA: ICD-10-CM

## 2020-08-20 DIAGNOSIS — F33.1 MODERATE EPISODE OF RECURRENT MAJOR DEPRESSIVE DISORDER (H): Primary | ICD-10-CM

## 2020-08-20 DIAGNOSIS — M54.50 CHRONIC LEFT-SIDED LOW BACK PAIN WITHOUT SCIATICA: ICD-10-CM

## 2020-08-20 DIAGNOSIS — R10.13 ABDOMINAL PAIN, EPIGASTRIC: ICD-10-CM

## 2020-08-20 PROCEDURE — 99214 OFFICE O/P EST MOD 30 MIN: CPT | Mod: 95 | Performed by: STUDENT IN AN ORGANIZED HEALTH CARE EDUCATION/TRAINING PROGRAM

## 2020-08-20 RX ORDER — SERTRALINE HYDROCHLORIDE 100 MG/1
200 TABLET, FILM COATED ORAL DAILY
Qty: 180 TABLET | Refills: 1 | Status: SHIPPED | OUTPATIENT
Start: 2020-08-20 | End: 2020-12-08

## 2020-08-20 RX ORDER — CYCLOBENZAPRINE HCL 10 MG
10 TABLET ORAL 3 TIMES DAILY PRN
Qty: 90 TABLET | Refills: 1 | Status: SHIPPED | OUTPATIENT
Start: 2020-08-20 | End: 2021-12-30

## 2020-08-20 RX ORDER — FAMOTIDINE 20 MG/1
20 TABLET, FILM COATED ORAL 2 TIMES DAILY
Qty: 180 TABLET | Refills: 2 | Status: SHIPPED | OUTPATIENT
Start: 2020-08-20 | End: 2021-09-14

## 2020-08-20 ASSESSMENT — ANXIETY QUESTIONNAIRES
3. WORRYING TOO MUCH ABOUT DIFFERENT THINGS: MORE THAN HALF THE DAYS
2. NOT BEING ABLE TO STOP OR CONTROL WORRYING: SEVERAL DAYS
GAD7 TOTAL SCORE: 11
1. FEELING NERVOUS, ANXIOUS, OR ON EDGE: MORE THAN HALF THE DAYS
7. FEELING AFRAID AS IF SOMETHING AWFUL MIGHT HAPPEN: NOT AT ALL
5. BEING SO RESTLESS THAT IT IS HARD TO SIT STILL: SEVERAL DAYS
6. BECOMING EASILY ANNOYED OR IRRITABLE: NEARLY EVERY DAY
IF YOU CHECKED OFF ANY PROBLEMS ON THIS QUESTIONNAIRE, HOW DIFFICULT HAVE THESE PROBLEMS MADE IT FOR YOU TO DO YOUR WORK, TAKE CARE OF THINGS AT HOME, OR GET ALONG WITH OTHER PEOPLE: SOMEWHAT DIFFICULT

## 2020-08-20 ASSESSMENT — PATIENT HEALTH QUESTIONNAIRE - PHQ9
5. POOR APPETITE OR OVEREATING: MORE THAN HALF THE DAYS
SUM OF ALL RESPONSES TO PHQ QUESTIONS 1-9: 7

## 2020-08-21 ASSESSMENT — ANXIETY QUESTIONNAIRES: GAD7 TOTAL SCORE: 11

## 2020-11-07 ENCOUNTER — HEALTH MAINTENANCE LETTER (OUTPATIENT)
Age: 30
End: 2020-11-07

## 2020-12-07 DIAGNOSIS — F33.1 MODERATE EPISODE OF RECURRENT MAJOR DEPRESSIVE DISORDER (H): ICD-10-CM

## 2020-12-07 NOTE — LETTER
[facility]  [address] [facility] [address]  [phone]       December 11, 2020    Sudhakar Abdalla  [address] [place] [id number]    Dear Sudhakar,    This questionnaire is about depression for your upcoming visit or contact, and your care team may not see this information before then.  We care about you.  If at any time you feel unsafe or have concerns about the safety of others please take immediate action by calling [phone], for mental health crisis phone support 24 hours a day, 365 days per year.  As always, you can also go to your local ER, or call 911 if you have immediate safety concerns.    Please complete the enclosed questionnaire and return to us at the address above.    Thank you for trusting [facility] and we appreciate the opportunity to serve you.  We look forward to supporting your healthcare needs in the future.    Healthy Regards,    Your [facility] Team

## 2020-12-08 RX ORDER — SERTRALINE HYDROCHLORIDE 100 MG/1
TABLET, FILM COATED ORAL
Qty: 180 TABLET | Refills: 0 | Status: SHIPPED | OUTPATIENT
Start: 2020-12-08 | End: 2021-07-06

## 2020-12-08 NOTE — TELEPHONE ENCOUNTER
Cell phone incorrect, whoever answered didn't know who gerardo was.     Left message for pt to return our call on home phone

## 2020-12-08 NOTE — TELEPHONE ENCOUNTER
Pending Prescriptions:                       Disp   Refills    sertraline (ZOLOFT) 100 MG tablet [Pharma*180 ta*0            Sig: TAKE TWO TABLETS BY MOUTH ONCE DAILY    Medication is being filled for 1 time louis refill only due to:  Patient is due for phq9    Please call and help schedule.  Thank you!    Christine Street RN  December 8, 2020

## 2020-12-11 NOTE — TELEPHONE ENCOUNTER
Letter sent w/ PHQ9 and PHQ9 sent via [employer]. Closing as we have not gotten a return call.    Hilda Combs CMA ([facility])

## 2021-07-06 ENCOUNTER — MYC REFILL (OUTPATIENT)
Dept: FAMILY MEDICINE | Facility: OTHER | Age: 31
End: 2021-07-06

## 2021-07-06 DIAGNOSIS — F33.1 MODERATE EPISODE OF RECURRENT MAJOR DEPRESSIVE DISORDER (H): ICD-10-CM

## 2021-07-06 RX ORDER — SERTRALINE HYDROCHLORIDE 100 MG/1
TABLET, FILM COATED ORAL
Qty: 180 TABLET | Refills: 0 | Status: CANCELLED | OUTPATIENT
Start: 2021-07-06

## 2021-07-06 RX ORDER — SERTRALINE HYDROCHLORIDE 100 MG/1
200 TABLET, FILM COATED ORAL DAILY
Qty: 60 TABLET | Refills: 0 | Status: SHIPPED | OUTPATIENT
Start: 2021-07-06 | End: 2021-07-12

## 2021-07-06 NOTE — TELEPHONE ENCOUNTER
Pending Prescriptions:                       Disp   Refills    sertraline (ZOLOFT) 100 MG tablet          180 ta*0              Routing refill request to provider for review/approval because:  Dosing change  Christine Street RN, BSN  [facility]  July 6, 2021

## 2021-07-12 ENCOUNTER — VIRTUAL VISIT (OUTPATIENT)
Dept: FAMILY MEDICINE | Facility: OTHER | Age: 31
End: 2021-07-12
Payer: COMMERCIAL

## 2021-07-12 DIAGNOSIS — F51.04 PSYCHOPHYSIOLOGICAL INSOMNIA: ICD-10-CM

## 2021-07-12 DIAGNOSIS — F33.1 MODERATE EPISODE OF RECURRENT MAJOR DEPRESSIVE DISORDER (H): Primary | ICD-10-CM

## 2021-07-12 PROCEDURE — 99214 OFFICE O/P EST MOD 30 MIN: CPT | Mod: 95 | Performed by: FAMILY MEDICINE

## 2021-07-12 RX ORDER — SERTRALINE HYDROCHLORIDE 100 MG/1
200 TABLET, FILM COATED ORAL DAILY
Qty: 60 TABLET | Refills: 0 | Status: SHIPPED | OUTPATIENT
Start: 2021-07-12 | End: 2021-09-14

## 2021-07-12 RX ORDER — QUETIAPINE FUMARATE 25 MG/1
25-50 TABLET, FILM COATED ORAL AT BEDTIME
Qty: 60 TABLET | Refills: 1 | Status: SHIPPED | OUTPATIENT
Start: 2021-07-12 | End: 2021-09-14

## 2021-07-12 ASSESSMENT — ANXIETY QUESTIONNAIRES
5. BEING SO RESTLESS THAT IT IS HARD TO SIT STILL: SEVERAL DAYS
1. FEELING NERVOUS, ANXIOUS, OR ON EDGE: SEVERAL DAYS
GAD7 TOTAL SCORE: 13
3. WORRYING TOO MUCH ABOUT DIFFERENT THINGS: NEARLY EVERY DAY
7. FEELING AFRAID AS IF SOMETHING AWFUL MIGHT HAPPEN: NEARLY EVERY DAY
6. BECOMING EASILY ANNOYED OR IRRITABLE: NEARLY EVERY DAY
IF YOU CHECKED OFF ANY PROBLEMS ON THIS QUESTIONNAIRE, HOW DIFFICULT HAVE THESE PROBLEMS MADE IT FOR YOU TO DO YOUR WORK, TAKE CARE OF THINGS AT HOME, OR GET ALONG WITH OTHER PEOPLE: SOMEWHAT DIFFICULT
2. NOT BEING ABLE TO STOP OR CONTROL WORRYING: SEVERAL DAYS

## 2021-07-12 ASSESSMENT — PATIENT HEALTH QUESTIONNAIRE - PHQ9
5. POOR APPETITE OR OVEREATING: SEVERAL DAYS
SUM OF ALL RESPONSES TO PHQ QUESTIONS 1-9: 12

## 2021-07-12 NOTE — PROGRESS NOTES
Sudhakar is a 31 year old who is being evaluated via a billable video visit.      How would you like to obtain your AVS? MyChart  If the video visit is dropped, the invitation should be resent by: Send to e-mail at: [email or web address]  Will anyone else be joining your video visit? No      Video Start Time: 2:05 PM    Assessment & Plan       ICD-10-CM    1. Moderate episode of recurrent major depressive disorder (H)  F33.1 QUEtiapine (SEROQUEL) 25 MG tablet     sertraline (ZOLOFT) 100 MG tablet   2. Psychophysiological insomnia  F51.04 QUEtiapine (SEROQUEL) 25 MG tablet      Patient with worsening depression despite maximal dose of sertraline.  Discussed various options with patient.  He would like to try adding Seroquel to his current regimen as this may help with both his mood and his insomnia.  Follow-up in 1 month to ensure improvement.  Watch for any signs or symptoms of side effects.    Portions of this note were completed using Dragon dictation software.  Although reviewed, there may be typographical and other inadvertent errors that remain.     Prescription drug management  20 minutes spent on the date of the encounter doing chart review, history and exam, documentation and further activities per the note       Tobacco Cessation:   reports that he has been smoking cigarettes. He started smoking about 16 years ago. He has a 12.00 pack-year smoking history. He has quit using smokeless tobacco.  Tobacco Cessation Action Plan: Information offered: Patient not interested at this time    Patient Instructions   Thank you for visiting [facility]    I am glad that you are getting some benefit from sertraline.    Lets see if we can make it more effective with the addition of Seroquel at night.  This should also help you sleep better.  Start with 25 mg nightly.  Can increase to 50 mg nightly if needed.    Follow-up in 1 month to ensure improvement.    Good luck with your house!    Contact us or return  if questions or concerns.     Have a nice day!    Dr. Wade     Return in about 4 weeks (around 8/9/2021).      If you need medication refills, please contact your pharmacy 3 days before your prescriptions runs out or download the [facility] suzi for your smart phone. If you are out of refills, your pharmacy will contact contact the clinic.                                     Philip Ewing [phone]                       Return in about 4 weeks (around 8/9/2021) for Recheck.    Delbert Wade MD, MD  [facility] is a 31 year old who presents for the following health issues     HPI     Depression and Anxiety Follow-Up    How are you doing with your depression since your last visit? No change    How are you doing with your anxiety since your last visit?  No change    Are you having other symptoms that might be associated with depression or anxiety? No    Have you had a significant life event? OTHER: bought a house     Do you have any concerns with your use of alcohol or other drugs? No    They just bought a house in [place].  It has been a bit stressful.    He is interested in increasing his depression treatment.  He notes poor sleep.  Frequently up until 3 AM.  Sometimes has his mind racing, can have a hard time falling asleep.  Some anxiety over the last couple of weeks too.      Social History     Tobacco Use     Smoking status: Current Every Day Smoker     Packs/day: 1.00     Years: 12.00     Pack years: 12.00     Types: Cigarettes     Start date: 8/24/2004     Smokeless tobacco: Former User   Substance Use Topics     Alcohol use: Not Currently     Alcohol/week: 0.0 standard drinks     Drug use: Yes     Types: Marijuana     Comment: marijuana almost daily      PHQ 3/2/2020 8/20/2020 7/12/2021   PHQ-9 Total Score 8 7 12   Q9: Thoughts of better off dead/self-harm past 2 weeks Not at all Several days Not at all     CARIDAD-7 SCORE 3/2/2020 8/20/2020  7/12/2021   Total Score 6 (mild anxiety) - -   Total Score 6 11 13         Suicide Assessment Five-step Evaluation and Treatment (SAFE-T)        Review of Systems   Constitutional, HEENT, cardiovascular, pulmonary, gi and gu systems are negative, except as otherwise noted.      Objective           Vitals:  No vitals were obtained today due to virtual visit.    Physical Exam   GENERAL: Healthy, alert and no distress  EYES: Eyes grossly normal to inspection.  No discharge or erythema, or obvious scleral/conjunctival abnormalities.  RESP: No audible wheeze, cough, or visible cyanosis.  No visible retractions or increased work of breathing.    SKIN: Visible skin clear. No significant rash, abnormal pigmentation or lesions.  NEURO: Cranial nerves grossly intact.  Mentation and speech appropriate for age.  PSYCH: Mentation appears normal, affect normal/bright, judgement and insight intact, normal speech and appearance well-groomed.    Admission on 09/27/2018, Discharged on 09/27/2018   Component Date Value Ref Range Status     WBC 09/27/2018 9.0  4.0 - 11.0 10e9/L Final     RBC Count 09/27/2018 4.52  4.4 - 5.9 10e12/L Final     Hemoglobin 09/27/2018 14.1  13.3 - 17.7 g/dL Final     Hematocrit 09/27/2018 40.6  40.0 - 53.0 % Final     MCV 09/27/2018 90  78 - 100 fl Final     MCH 09/27/2018 31.2  26.5 - 33.0 pg Final     MCHC 09/27/2018 34.7  31.5 - 36.5 g/dL Final     RDW 09/27/2018 13.2  10.0 - 15.0 % Final     Platelet Count 09/27/2018 201  150 - 450 10e9/L Final     Diff Method 09/27/2018 Automated Method   Final     % Neutrophils 09/27/2018 45.7  % Final     % Lymphocytes 09/27/2018 42.7  % Final     % Monocytes 09/27/2018 9.5  % Final     % Eosinophils 09/27/2018 1.8  % Final     % Basophils 09/27/2018 0.2  % Final     % Immature Granulocytes 09/27/2018 0.1  % Final     Nucleated RBCs 09/27/2018 0  0 /100 Final     Absolute Neutrophil 09/27/2018 4.1  1.6 - 8.3 10e9/L Final     Absolute Lymphocytes 09/27/2018 3.8  0.8 -  5.3 10e9/L Final     Absolute Monocytes 09/27/2018 0.9  0.0 - 1.3 10e9/L Final     Absolute Basophils 09/27/2018 0.0  0.0 - 0.2 10e9/L Final     Abs Immature Granulocytes 09/27/2018 0.0  0 - 0.4 10e9/L Final     Absolute Nucleated RBC 09/27/2018 0.0   Final     Sodium 09/27/2018 142  133 - 144 mmol/L Final     Potassium 09/27/2018 3.9  3.4 - 5.3 mmol/L Final     Chloride 09/27/2018 107  94 - 109 mmol/L Final     Carbon Dioxide 09/27/2018 26  20 - 32 mmol/L Final     Anion Gap 09/27/2018 9  3 - 14 mmol/L Final     Glucose 09/27/2018 92  70 - 99 mg/dL Final     Urea Nitrogen 09/27/2018 13  7 - 30 mg/dL Final     Creatinine 09/27/2018 0.82  0.66 - 1.25 mg/dL Final     GFR Estimate 09/27/2018 >90  >60 mL/min/1.7m2 Final    Non  GFR Calc     GFR Estimate If Black 09/27/2018 >90  >60 mL/min/1.7m2 Final    African American GFR Calc     Calcium 09/27/2018 8.4* 8.5 - 10.1 mg/dL Final     Bilirubin Total 09/27/2018 0.2  0.2 - 1.3 mg/dL Final     Albumin 09/27/2018 3.6  3.4 - 5.0 g/dL Final     Protein Total 09/27/2018 6.8  6.8 - 8.8 g/dL Final     Alkaline Phosphatase 09/27/2018 94  40 - 150 U/L Final     ALT 09/27/2018 28  0 - 70 U/L Final     AST 09/27/2018 13  0 - 45 U/L Final     Lipase 09/27/2018 471* 73 - 393 U/L Final     Color Urine 09/27/2018 Yellow   Final     Appearance Urine 09/27/2018 Clear   Final     Glucose Urine 09/27/2018 Negative  NEG^Negative mg/dL Final     Bilirubin Urine 09/27/2018 Negative  NEG^Negative Final     Ketones Urine 09/27/2018 Negative  NEG^Negative mg/dL Final     Specific Gravity Urine 09/27/2018 1.015  1.003 - 1.035 Final     Blood Urine 09/27/2018 Negative  NEG^Negative Final     pH Urine 09/27/2018 7.0  5.0 - 7.0 pH Final     Protein Albumin Urine 09/27/2018 Negative  NEG^Negative mg/dL Final     Urobilinogen mg/dL 09/27/2018 2.0  0.0 - 2.0 mg/dL Final     Nitrite Urine 09/27/2018 Negative  NEG^Negative Final     Leukocyte Esterase Urine 09/27/2018 Negative  NEG^Negative  Final     Source 09/27/2018 Midstream Urine   Final     WBC Urine 09/27/2018 1  0 - 5 /HPF Final     RBC Urine 09/27/2018 <1  0 - 2 /HPF Final     Squamous Epithelial /HPF Urine 09/27/2018 <1  0 - 1 /HPF Final     Mucous Urine 09/27/2018 Present* NEG^Negative /LPF Final     sperm 09/27/2018 Present* NEG^Negative /HPF Final               Video-Visit Details    Type of service:  Video Visit    Video End Time:2:17 PM    Originating Location (pt. Location): Home    Distant Location (provider location):  [facility]     Platform used for Video Visit: [employer]

## 2021-07-12 NOTE — PATIENT INSTRUCTIONS
Thank you for visiting [facility]    I am glad that you are getting some benefit from sertraline.    Lets see if we can make it more effective with the addition of Seroquel at night.  This should also help you sleep better.  Start with 25 mg nightly.  Can increase to 50 mg nightly if needed.    Follow-up in 1 month to ensure improvement.    Good luck with your house!    Contact us or return if questions or concerns.     Have a nice day!    Dr. Wade     Return in about 4 weeks (around 8/9/2021).      If you need medication refills, please contact your pharmacy 3 days before your prescriptions runs out or download the [facility] suzi for your smart phone. If you are out of refills, your pharmacy will contact contact the clinic.                                     MyChart Assistance [phone]

## 2021-07-13 ASSESSMENT — ANXIETY QUESTIONNAIRES: GAD7 TOTAL SCORE: 13

## 2021-09-05 ENCOUNTER — HEALTH MAINTENANCE LETTER (OUTPATIENT)
Age: 31
End: 2021-09-05

## 2021-09-14 DIAGNOSIS — R10.13 ABDOMINAL PAIN, EPIGASTRIC: ICD-10-CM

## 2021-09-14 DIAGNOSIS — F51.04 PSYCHOPHYSIOLOGICAL INSOMNIA: ICD-10-CM

## 2021-09-14 DIAGNOSIS — F33.1 MODERATE EPISODE OF RECURRENT MAJOR DEPRESSIVE DISORDER (H): ICD-10-CM

## 2021-09-14 RX ORDER — SERTRALINE HYDROCHLORIDE 100 MG/1
200 TABLET, FILM COATED ORAL DAILY
Qty: 60 TABLET | Refills: 2 | Status: SHIPPED | OUTPATIENT
Start: 2021-09-14 | End: 2021-11-18

## 2021-09-14 RX ORDER — FAMOTIDINE 20 MG/1
20 TABLET, FILM COATED ORAL 2 TIMES DAILY
Qty: 60 TABLET | Refills: 2 | Status: SHIPPED | OUTPATIENT
Start: 2021-09-14 | End: 2021-12-30

## 2021-09-14 RX ORDER — QUETIAPINE FUMARATE 25 MG/1
25-50 TABLET, FILM COATED ORAL AT BEDTIME
Qty: 60 TABLET | Refills: 2 | Status: SHIPPED | OUTPATIENT
Start: 2021-09-14 | End: 2021-12-30

## 2021-09-14 NOTE — TELEPHONE ENCOUNTER
Harrison    Pharmacy will inform of follow up needed in dec.    Jeancarlos Guy, BSN, RN, PHN  [facility] ~ Registered Nurse  Clinic Triage ~ [place] [facility]  September 14, 2021

## 2021-11-15 DIAGNOSIS — F33.1 MODERATE EPISODE OF RECURRENT MAJOR DEPRESSIVE DISORDER (H): ICD-10-CM

## 2021-11-17 NOTE — TELEPHONE ENCOUNTER
Pending Prescriptions:                       Disp   Refills    sertraline (ZOLOFT) 100 MG tablet [Pharmac*60 tab*2        Sig: TAKE 2 TABLETS (200 MG) BY MOUTH DAILY    Routing refill request to provider for review/approval because:  Labs out of range:  PHQ-9 score:    PHQ 7/12/2021   PHQ-9 Total Score 12   Q9: Thoughts of better off dead/self-harm past 2 weeks Not at all

## 2021-11-18 RX ORDER — SERTRALINE HYDROCHLORIDE 100 MG/1
200 TABLET, FILM COATED ORAL DAILY
Qty: 60 TABLET | Refills: 0 | Status: SHIPPED | OUTPATIENT
Start: 2021-11-18 | End: 2021-12-30

## 2021-11-18 NOTE — TELEPHONE ENCOUNTER
Your medication has been refilled.  Please schedule a visit (virtual  OK) to follow up on how this medication is working for you before your next refill.  We need to be sure everything is working well and stable prior to further refills.

## 2021-12-13 DIAGNOSIS — R10.13 ABDOMINAL PAIN, EPIGASTRIC: ICD-10-CM

## 2021-12-16 RX ORDER — FAMOTIDINE 20 MG/1
20 TABLET, FILM COATED ORAL 2 TIMES DAILY
Qty: 60 TABLET | Refills: 2 | OUTPATIENT
Start: 2021-12-16

## 2021-12-16 NOTE — TELEPHONE ENCOUNTER
Pending Prescriptions:                       Disp   Refills    famotidine (PEPCID) 20 MG tablet [Pharmacy*60 tab*2        Sig: Take 1 tablet (20 mg) by mouth 2 times daily        Support staff:   Please call schedule a visit. (F2F, phone, or e-visit)medication check.   Then ask if the patient will need a refill of the above medication prior to the visit.   Please  route to the provider to give a louis to get them until their visit or to remove the medication and close encounter.   Thank You    Christine Street RN  December 16, 2021

## 2021-12-26 ENCOUNTER — HEALTH MAINTENANCE LETTER (OUTPATIENT)
Age: 31
End: 2021-12-26

## 2021-12-29 NOTE — PROGRESS NOTES
Sudhakar is a 31 year old who is being evaluated via a billable telephone visit.      What phone number would you like to be contacted at? [phone]  How would you like to obtain your AVS? MyChart    Assessment & Plan       ICD-10-CM    1. Moderate episode of recurrent major depressive disorder (H)  F33.1 QUEtiapine (SEROQUEL) 50 MG tablet     sertraline (ZOLOFT) 100 MG tablet   2. Exposure to 2019 novel coronavirus  Z20.822    3. Upper respiratory tract infection, unspecified type  J06.9    4. Chronic left-sided low back pain without sciatica  M54.50 cyclobenzaprine (FLEXERIL) 10 MG tablet    G89.29 Physical Therapy Referral   5. Gastroesophageal reflux disease, unspecified whether esophagitis present  K21.9 famotidine (PEPCID) 20 MG tablet   6. Psychophysiological insomnia  F51.04 QUEtiapine (SEROQUEL) 50 MG tablet      1.  Not fully controlled.  Unclear if this is chronic poor control versus current stresses related to his recent difficulty with car breaking down, which required 2 days to drive what would normally be four hours.  Usual holiday stress, his wife's new Covid infection, may also be playing a huge role in his current symptoms.  Obviously, some of these will be addressed in the near term.  But since I cannot be certain this is not a chronic incomplete control of his depression, discussed possible increase in Seroquel dosing.  Patient was open to this.  Recommended follow-up in 1 to 3 months.  2, 3.  Clinically highly suspicious for Covid infection.  Recommended testing.  Patient states he will get a home test.  Recommended quarantining and symptom control.  Discussed that if he is clinically worsening, we can consider more aggressive treatment options.  I did encourage him to consider monoclonal antibody infusion for his wife who is apparently much more ill.  I am happy to assist as able with any further treatments as needed.  Recommended COVID vaccination for patient and his family.  This may need to  wait until he is recovered.  4.  Not fully controlled.  Patient reports some improvement with Flexeril.  We will renew this.  Also recommended trial of physical therapy again.  Referral was placed.  Is unclear if this will need to be transferred to an outside organization.  If so, that will be fine with me.  5.  Fair control currently.  Patient has not been taking famotidine twice daily.  We will try to increase to twice daily and let me know if not adequately improving.  This was renewed today.  6.  Clinically controlled.  Will continue current regimen other than the adjustment of the Seroquel that noted above.  Follow-up in 1 to 3 months.    Portions of this note were completed using Dragon dictation software.  Although reviewed, there may be typographical and other inadvertent errors that remain.       Prescription drug management  30 minutes spent on the date of the encounter doing chart review, history and exam, documentation and further activities per the note       Depression Screening Follow Up    PHQ 12/30/2021   PHQ-9 Total Score 10   Q9: Thoughts of better off dead/self-harm past 2 weeks Several days           C-SSRS (Brief [place]) 12/30/2021   Within the last month, have you wished you were dead or wished you could go to sleep and not wake up? Yes   Within the last month, have you had any actual thoughts of killing yourself? Yes   Within the last month, have you been thinking about how you might do this? No   Within the last month, have you had these thoughts and had some intention of acting on them? No   Within the last month, have you started to work out or worked out the details of how to kill yourself with the intent to carry out this plan? No   Within the last month, have you ever done anything, started to do anything, or prepared to do anything to end your life? No         Follow Up        Follow Up Actions Taken  Crisis resource information provided in the After Visit Summary  Patient to follow  up with PCP.  Clinic staff to schedule appointment if able.  Patient declined referral.    Discussed the following ways the patient can remain in a safe environment:  be around others  There are no Patient Instructions on file for this visit.    No follow-ups on file.    Delbert Wade MD, MD  [facility] is a 31 year old who presents for the following health issues     HPI     Medication Refill: Pepcid.  GERD is fairly controlled.  Does have some days where it's more bothersome.  Usually related to eating something that's more irritating and not taking the 2nd dose.  Had been usually taking at night.      Flexeril- helps with his back pain.  Out of the medication.  Takes with ibuprofen.  Also some tylenol.  Hasn't done PT for a long time.      Mood has been good.  But then admits some current symptoms when we go through his screening.  He does feel that a lot of this is related to the holidays.      PHQ 8/20/2020 7/12/2021 12/30/2021   PHQ-9 Total Score 7 12 10   Q9: Thoughts of better off dead/self-harm past 2 weeks Several days Not at all Several days       Pt admits to one suicidal thought that was very brief.  No plan. No further symptoms.  Has someone he can reach out to if these recur.      His wife currently has COVID, just diagnosed yesterday.  She's doing OK.  She is at home.      He does have a cough.  Stuffy head as well.      Pt continues to smoke.  Encouraged cessation.        Review of Systems   Constitutional, HEENT, cardiovascular, pulmonary, gi and gu systems are negative, except as otherwise noted.      Objective           Vitals:  No vitals were obtained today due to virtual visit.    Physical Exam   healthy, alert and no distress  PSYCH: Alert and oriented times 3; coherent speech, normal   rate and volume, able to articulate logical thoughts, able   to abstract reason, no tangential thoughts, no hallucinations   or delusions  His affect is  normal  RESP: No cough, no audible wheezing, able to talk in full sentences  Remainder of exam unable to be completed due to telephone visits    Admission on 09/27/2018, Discharged on 09/27/2018   Component Date Value Ref Range Status     WBC 09/27/2018 9.0  4.0 - 11.0 10e9/L Final     RBC Count 09/27/2018 4.52  4.4 - 5.9 10e12/L Final     Hemoglobin 09/27/2018 14.1  13.3 - 17.7 g/dL Final     Hematocrit 09/27/2018 40.6  40.0 - 53.0 % Final     MCV 09/27/2018 90  78 - 100 fl Final     MCH 09/27/2018 31.2  26.5 - 33.0 pg Final     MCHC 09/27/2018 34.7  31.5 - 36.5 g/dL Final     RDW 09/27/2018 13.2  10.0 - 15.0 % Final     Platelet Count 09/27/2018 201  150 - 450 10e9/L Final     Diff Method 09/27/2018 Automated Method   Final     % Neutrophils 09/27/2018 45.7  % Final     % Lymphocytes 09/27/2018 42.7  % Final     % Monocytes 09/27/2018 9.5  % Final     % Eosinophils 09/27/2018 1.8  % Final     % Basophils 09/27/2018 0.2  % Final     % Immature Granulocytes 09/27/2018 0.1  % Final     Nucleated RBCs 09/27/2018 0  0 /100 Final     Absolute Neutrophil 09/27/2018 4.1  1.6 - 8.3 10e9/L Final     Absolute Lymphocytes 09/27/2018 3.8  0.8 - 5.3 10e9/L Final     Absolute Monocytes 09/27/2018 0.9  0.0 - 1.3 10e9/L Final     Absolute Basophils 09/27/2018 0.0  0.0 - 0.2 10e9/L Final     Abs Immature Granulocytes 09/27/2018 0.0  0 - 0.4 10e9/L Final     Absolute Nucleated RBC 09/27/2018 0.0   Final     Sodium 09/27/2018 142  133 - 144 mmol/L Final     Potassium 09/27/2018 3.9  3.4 - 5.3 mmol/L Final     Chloride 09/27/2018 107  94 - 109 mmol/L Final     Carbon Dioxide 09/27/2018 26  20 - 32 mmol/L Final     Anion Gap 09/27/2018 9  3 - 14 mmol/L Final     Glucose 09/27/2018 92  70 - 99 mg/dL Final     Urea Nitrogen 09/27/2018 13  7 - 30 mg/dL Final     Creatinine 09/27/2018 0.82  0.66 - 1.25 mg/dL Final     GFR Estimate 09/27/2018 >90  >60 mL/min/1.7m2 Final    Non  GFR Calc     GFR Estimate If Black 09/27/2018  >90  >60 mL/min/1.7m2 Final    African American GFR Calc     Calcium 09/27/2018 8.4* 8.5 - 10.1 mg/dL Final     Bilirubin Total 09/27/2018 0.2  0.2 - 1.3 mg/dL Final     Albumin 09/27/2018 3.6  3.4 - 5.0 g/dL Final     Protein Total 09/27/2018 6.8  6.8 - 8.8 g/dL Final     Alkaline Phosphatase 09/27/2018 94  40 - 150 U/L Final     ALT 09/27/2018 28  0 - 70 U/L Final     AST 09/27/2018 13  0 - 45 U/L Final     Lipase 09/27/2018 471* 73 - 393 U/L Final     Color Urine 09/27/2018 Yellow   Final     Appearance Urine 09/27/2018 Clear   Final     Glucose Urine 09/27/2018 Negative  NEG^Negative mg/dL Final     Bilirubin Urine 09/27/2018 Negative  NEG^Negative Final     Ketones Urine 09/27/2018 Negative  NEG^Negative mg/dL Final     Specific Gravity Urine 09/27/2018 1.015  1.003 - 1.035 Final     Blood Urine 09/27/2018 Negative  NEG^Negative Final     pH Urine 09/27/2018 7.0  5.0 - 7.0 pH Final     Protein Albumin Urine 09/27/2018 Negative  NEG^Negative mg/dL Final     Urobilinogen mg/dL 09/27/2018 2.0  0.0 - 2.0 mg/dL Final     Nitrite Urine 09/27/2018 Negative  NEG^Negative Final     Leukocyte Esterase Urine 09/27/2018 Negative  NEG^Negative Final     Source 09/27/2018 Midstream Urine   Final     WBC Urine 09/27/2018 1  0 - 5 /HPF Final     RBC Urine 09/27/2018 <1  0 - 2 /HPF Final     Squamous Epithelial /HPF Urine 09/27/2018 <1  0 - 1 /HPF Final     Mucous Urine 09/27/2018 Present* NEG^Negative /LPF Final     sperm 09/27/2018 Present* NEG^Negative /HPF Final     No results found for any visits on 12/30/21.  No results found for this or any previous visit (from the past 24 hour(s)).            Phone call duration: 21 minutes

## 2021-12-30 ENCOUNTER — VIRTUAL VISIT (OUTPATIENT)
Dept: FAMILY MEDICINE | Facility: OTHER | Age: 31
End: 2021-12-30
Payer: COMMERCIAL

## 2021-12-30 DIAGNOSIS — K21.9 GASTROESOPHAGEAL REFLUX DISEASE, UNSPECIFIED WHETHER ESOPHAGITIS PRESENT: ICD-10-CM

## 2021-12-30 DIAGNOSIS — M54.50 CHRONIC LEFT-SIDED LOW BACK PAIN WITHOUT SCIATICA: ICD-10-CM

## 2021-12-30 DIAGNOSIS — J06.9 UPPER RESPIRATORY TRACT INFECTION, UNSPECIFIED TYPE: ICD-10-CM

## 2021-12-30 DIAGNOSIS — G89.29 CHRONIC LEFT-SIDED LOW BACK PAIN WITHOUT SCIATICA: ICD-10-CM

## 2021-12-30 DIAGNOSIS — Z20.822 EXPOSURE TO 2019 NOVEL CORONAVIRUS: ICD-10-CM

## 2021-12-30 DIAGNOSIS — F33.1 MODERATE EPISODE OF RECURRENT MAJOR DEPRESSIVE DISORDER (H): Primary | ICD-10-CM

## 2021-12-30 DIAGNOSIS — F17.200 TOBACCO DEPENDENCE SYNDROME: ICD-10-CM

## 2021-12-30 DIAGNOSIS — F51.04 PSYCHOPHYSIOLOGICAL INSOMNIA: ICD-10-CM

## 2021-12-30 PROCEDURE — 99214 OFFICE O/P EST MOD 30 MIN: CPT | Mod: 95 | Performed by: FAMILY MEDICINE

## 2021-12-30 RX ORDER — CYCLOBENZAPRINE HCL 10 MG
10 TABLET ORAL 3 TIMES DAILY PRN
Qty: 90 TABLET | Refills: 1 | Status: SHIPPED | OUTPATIENT
Start: 2021-12-30 | End: 2022-04-08

## 2021-12-30 RX ORDER — SERTRALINE HYDROCHLORIDE 100 MG/1
200 TABLET, FILM COATED ORAL DAILY
Qty: 180 TABLET | Refills: 0 | Status: SHIPPED | OUTPATIENT
Start: 2021-12-30 | End: 2022-04-21

## 2021-12-30 RX ORDER — FAMOTIDINE 20 MG/1
20 TABLET, FILM COATED ORAL 2 TIMES DAILY
Qty: 180 TABLET | Refills: 3 | Status: SHIPPED | OUTPATIENT
Start: 2021-12-30

## 2021-12-30 RX ORDER — QUETIAPINE FUMARATE 50 MG/1
50 TABLET, FILM COATED ORAL AT BEDTIME
Qty: 90 TABLET | Refills: 0 | Status: SHIPPED | OUTPATIENT
Start: 2021-12-30 | End: 2022-04-25

## 2021-12-30 ASSESSMENT — COLUMBIA-SUICIDE SEVERITY RATING SCALE - C-SSRS
2. IN THE PAST MONTH, HAVE YOU ACTUALLY HAD ANY THOUGHTS OF KILLING YOURSELF?: YES
3. IN THE PAST MONTH, HAVE YOU BEEN THINKING ABOUT HOW YOU MIGHT KILL YOURSELF?: NO
5. IN THE PAST MONTH, HAVE YOU STARTED TO WORK OUT OR WORKED OUT THE DETAILS OF HOW TO KILL YOURSELF? DO YOU INTEND TO CARRY OUT THIS PLAN?: NO
6. HAVE YOU EVER DONE ANYTHING, STARTED TO DO ANYTHING, OR PREPARED TO DO ANYTHING TO END YOUR LIFE?: NO
5. IN THE PAST MONTH, HAVE YOU STARTED TO WORK OUT OR WORKED OUT THE DETAILS OF HOW TO KILL YOURSELF? DO YOU INTEND TO CARRY OUT THIS PLAN?: NO
1. WITHIN THE PAST MONTH, HAVE YOU WISHED YOU WERE DEAD OR WISHED YOU COULD GO TO SLEEP AND NOT WAKE UP?: YES

## 2021-12-30 ASSESSMENT — PATIENT HEALTH QUESTIONNAIRE - PHQ9: SUM OF ALL RESPONSES TO PHQ QUESTIONS 1-9: 10

## 2022-04-08 DIAGNOSIS — G89.29 CHRONIC LEFT-SIDED LOW BACK PAIN WITHOUT SCIATICA: ICD-10-CM

## 2022-04-08 DIAGNOSIS — M54.50 CHRONIC LEFT-SIDED LOW BACK PAIN WITHOUT SCIATICA: ICD-10-CM

## 2022-04-08 RX ORDER — CYCLOBENZAPRINE HCL 10 MG
10 TABLET ORAL 3 TIMES DAILY PRN
Qty: 90 TABLET | Refills: 1 | Status: SHIPPED | OUTPATIENT
Start: 2022-04-08

## 2022-04-08 NOTE — TELEPHONE ENCOUNTER
Pending Prescriptions:                       Disp   Refills    cyclobenzaprine (FLEXERIL) 10 MG tablet [P*90 tab*1        Sig: Take 1 tablet (10 mg) by mouth 3 times daily as           needed for muscle spasms    Routing refill request to provider for review/approval because:  Drug not on the [facility] refill protocol   cyclobenzaprine (FLEXERIL) 10 MG tablet 90 tablet 1 12/30/2021  No   Sig - Route: Take 1 tablet (10 mg) by mouth 3 times daily as needed for muscle spasms - Oral   Sent to pharmacy as: Cyclobenzaprine HCl 10 MG Oral Tablet (FLEXERIL)   Class: E-Prescribe   Order: [id number]   E-Prescribing Status: Receipt confirmed by pharmacy (12/30/2021 10:56 AM CST)       Requested Prescriptions   Pending Prescriptions Disp Refills    cyclobenzaprine (FLEXERIL) 10 MG tablet [Pharmacy Med Name: CYCLOBENZAPRINE 10 MG TAB 10 Tablet] 90 tablet 1     Sig: Take 1 tablet (10 mg) by mouth 3 times daily as needed for muscle spasms        There is no refill protocol information for this order

## 2022-04-20 DIAGNOSIS — F33.1 MODERATE EPISODE OF RECURRENT MAJOR DEPRESSIVE DISORDER (H): ICD-10-CM

## 2022-04-21 RX ORDER — SERTRALINE HYDROCHLORIDE 100 MG/1
200 TABLET, FILM COATED ORAL DAILY
Qty: 30 TABLET | Refills: 0 | Status: SHIPPED | OUTPATIENT
Start: 2022-04-21 | End: 2022-04-25

## 2022-04-21 NOTE — TELEPHONE ENCOUNTER
Pending Prescriptions:                       Disp   Refills    sertraline (ZOLOFT) 100 MG tablet [Pharmac*60 tab*0        Sig: Take 2 tablets (200 mg) by mouth daily    Routing refill request to provider for review/approval because:  Per 12/30 virtual visit to follow up in 3 months.  PHQ-9 score:    PHQ 12/30/2021   PHQ-9 Total Score 10   Q9: Thoughts of better off dead/self-harm past 2 weeks Several days     Stephanie Sorto RN

## 2022-04-25 ENCOUNTER — MYC REFILL (OUTPATIENT)
Dept: FAMILY MEDICINE | Facility: OTHER | Age: 32
End: 2022-04-25
Payer: COMMERCIAL

## 2022-04-25 DIAGNOSIS — F51.04 PSYCHOPHYSIOLOGICAL INSOMNIA: ICD-10-CM

## 2022-04-25 DIAGNOSIS — F33.1 MODERATE EPISODE OF RECURRENT MAJOR DEPRESSIVE DISORDER (H): ICD-10-CM

## 2022-04-28 RX ORDER — QUETIAPINE FUMARATE 50 MG/1
50 TABLET, FILM COATED ORAL AT BEDTIME
Qty: 65 TABLET | Refills: 0 | Status: SHIPPED | OUTPATIENT
Start: 2022-04-28

## 2022-04-28 RX ORDER — SERTRALINE HYDROCHLORIDE 100 MG/1
200 TABLET, FILM COATED ORAL DAILY
Qty: 120 TABLET | Refills: 0 | Status: SHIPPED | OUTPATIENT
Start: 2022-04-28 | End: 2022-08-11

## 2022-04-28 NOTE — TELEPHONE ENCOUNTER
Pending Prescriptions:                       Disp   Refills    QUEtiapine (SEROQUEL) 50 MG tablet         90 tab*0        Sig: Take 1 tablet (50 mg) by mouth At Bedtime    sertraline (ZOLOFT) 100 MG tablet          30 tab*0        Sig: Take 2 tablets (200 mg) by mouth daily    Routing refill request to provider for review/approval because:  Labs not current:  BP, Lipids, CBC, A1c  Patient needs to be seen because:  due for visit (already scheduled 2 months out)

## 2022-08-09 DIAGNOSIS — F33.1 MODERATE EPISODE OF RECURRENT MAJOR DEPRESSIVE DISORDER (H): ICD-10-CM

## 2022-08-10 NOTE — TELEPHONE ENCOUNTER
"Pending Prescriptions:                       Disp   Refills    sertraline (ZOLOFT) 100 MG tablet [Pharmac*60 tab*1        Sig: TAKE 2 TABLETS (200 MG) BY MOUTH DAILY        Routing refill request to provider for review/approval because:  SSRIs Protocol Failed    Rerun Protocol (8/9/2022 1:48 PM)      PHQ-9 score less than 5 in past 6 months    Please review last PHQ-9 score.       Recent (6 mo) or future (30 days) visit within the authorizing provider's specialty    Patient had office visit in the last 6 months or has a visit in the next 30 days with authorizing provider or within the authorizing provider's specialty.  See \"Patient Info\" tab in inbasket, or \"Choose Columns\" in Meds & Orders section of the refill encounter.        Medication is active on med list          Patient is age 18 or older          "

## 2022-08-11 RX ORDER — SERTRALINE HYDROCHLORIDE 100 MG/1
200 TABLET, FILM COATED ORAL DAILY
Qty: 60 TABLET | Refills: 0 | Status: SHIPPED | OUTPATIENT
Start: 2022-08-11 | End: 2022-09-14

## 2022-09-12 DIAGNOSIS — F33.1 MODERATE EPISODE OF RECURRENT MAJOR DEPRESSIVE DISORDER (H): ICD-10-CM

## 2022-09-14 RX ORDER — SERTRALINE HYDROCHLORIDE 100 MG/1
200 TABLET, FILM COATED ORAL DAILY
Qty: 60 TABLET | Refills: 1 | Status: SHIPPED | OUTPATIENT
Start: 2022-09-14 | End: 2022-10-18

## 2022-09-14 NOTE — TELEPHONE ENCOUNTER
Prescription approved per [facility] Refill Protocol.  Christine Street RN

## 2022-10-23 ENCOUNTER — HEALTH MAINTENANCE LETTER (OUTPATIENT)
Age: 32
End: 2022-10-23

## 2023-01-27 DIAGNOSIS — M54.50 CHRONIC LEFT-SIDED LOW BACK PAIN WITHOUT SCIATICA: ICD-10-CM

## 2023-01-27 DIAGNOSIS — K21.9 GASTROESOPHAGEAL REFLUX DISEASE, UNSPECIFIED WHETHER ESOPHAGITIS PRESENT: ICD-10-CM

## 2023-01-27 DIAGNOSIS — G89.29 CHRONIC LEFT-SIDED LOW BACK PAIN WITHOUT SCIATICA: ICD-10-CM

## 2023-01-27 NOTE — LETTER
February 6, 2023      Sudhakar Abdalla  [address] [address]      Sudhakar,      Your refill request for CYCLOBENZAPRINE 10 MG TAB 10 Tablet and FAMOTIDINE 20MG TABS 20 Tablet  was denied because you are over due for a follow up with Dr. Wade.    You can schedule and appointment using [employer] or by calling [phone]. Once you are on the schedule ask to speak to an RN to get medication to get you through to your appointment if appropriate.      To Schedule an Appointment via [employer] -     1) Click the Visits tab (located on the top left) and select the option to Schedule an Appointment  2) Choose the department you would like to schedule in and respond to any additional questions that populate.  (Note: There are also options for you to indicate preferred clinic locations and providers)  3) Verify your Personal Information by clicking This Information Is Correct or Edit if the information is not correct  4) Review the insurance information on file and if current, select This Information Is Correct   If the information is not correct, you can select options to remove or update your coverage.  5) Where prompted, please specify the reason for your appointment and select Schedule    After the appointment has been scheduled, you will see a confirmation with all the details for your upcoming visit.    Thank you,    [facility].

## 2023-01-31 RX ORDER — CYCLOBENZAPRINE HCL 10 MG
10 TABLET ORAL 3 TIMES DAILY PRN
Qty: 90 TABLET | Refills: 1 | OUTPATIENT
Start: 2023-01-31

## 2023-01-31 RX ORDER — FAMOTIDINE 20 MG/1
20 TABLET, FILM COATED ORAL 2 TIMES DAILY
Qty: 60 TABLET | Refills: 3 | OUTPATIENT
Start: 2023-01-31

## 2023-01-31 NOTE — TELEPHONE ENCOUNTER
"Requested Prescriptions   Pending Prescriptions Disp Refills    cyclobenzaprine (FLEXERIL) 10 MG tablet [Pharmacy Med Name: CYCLOBENZAPRINE 10 MG TAB 10 Tablet] 90 tablet 1     Sig: TAKE 1 TABLET (10 MG) BY MOUTH 3 TIMES DAILY AS NEEDED FOR MUSCLE SPASMS       There is no refill protocol information for this order       famotidine (PEPCID) 20 MG tablet [Pharmacy Med Name: FAMOTIDINE 20MG TABS 20 Tablet] 60 tablet 3     Sig: Take 1 tablet (20 mg) by mouth 2 times daily       H2 Blockers Protocol Failed - 1/27/2023 10:52 AM        Failed - Recent (12 mo) or future (30 days) visit within the authorizing provider's specialty     Patient has had an office visit with the authorizing provider or a provider within the authorizing providers department within the previous 12 mos or has a future within next 30 days. See \"Patient Info\" tab in inbasket, or \"Choose Columns\" in Meds & Orders section of the refill encounter.              Passed - Patient is age 12 or older        Passed - Medication is active on med list               "

## 2023-02-01 NOTE — TELEPHONE ENCOUNTER
Attempted to call patient.  Number is not in service.  Attempted Significant other.  C2C on file  Her number is not in service as well/

## 2023-02-06 NOTE — TELEPHONE ENCOUNTER
Staff have attempted to reach patient on 3 occassions with no success.     Letter printed and placed in bin for mailing.

## 2023-04-02 ENCOUNTER — HEALTH MAINTENANCE LETTER (OUTPATIENT)
Age: 33
End: 2023-04-02

## 2024-06-08 ENCOUNTER — HEALTH MAINTENANCE LETTER (OUTPATIENT)
Age: 34
End: 2024-06-08